# Patient Record
Sex: FEMALE | Race: WHITE | Employment: OTHER | URBAN - NONMETROPOLITAN AREA
[De-identification: names, ages, dates, MRNs, and addresses within clinical notes are randomized per-mention and may not be internally consistent; named-entity substitution may affect disease eponyms.]

---

## 2017-01-11 LAB
CHOLESTEROL, TOTAL: 143 MG/DL
CHOLESTEROL/HDL RATIO: 4.3
HDLC SERPL-MCNC: 33 MG/DL (ref 35–70)
LDL CHOLESTEROL CALCULATED: 88 MG/DL (ref 0–160)
TRIGL SERPL-MCNC: 108 MG/DL
VLDLC SERPL CALC-MCNC: ABNORMAL MG/DL

## 2017-11-09 LAB
CHOLESTEROL, TOTAL: 141 MG/DL
CHOLESTEROL/HDL RATIO: 2
HDLC SERPL-MCNC: 38 MG/DL (ref 35–70)
LDL CHOLESTEROL CALCULATED: 77 MG/DL (ref 0–160)
TRIGL SERPL-MCNC: 132 MG/DL
VLDLC SERPL CALC-MCNC: NORMAL MG/DL

## 2018-03-15 LAB
CHOLESTEROL, TOTAL: 141 MG/DL
CHOLESTEROL/HDL RATIO: NORMAL
HDLC SERPL-MCNC: 38 MG/DL (ref 35–70)
LDL CHOLESTEROL CALCULATED: 58 MG/DL (ref 0–160)
TRIGL SERPL-MCNC: 225 MG/DL
VLDLC SERPL CALC-MCNC: NORMAL MG/DL

## 2018-09-17 LAB
CHOLESTEROL, TOTAL: 139 MG/DL
CHOLESTEROL/HDL RATIO: 1.8
HDLC SERPL-MCNC: 42 MG/DL (ref 35–70)
LDL CHOLESTEROL CALCULATED: 78 MG/DL (ref 0–160)
TRIGL SERPL-MCNC: 93 MG/DL
VLDLC SERPL CALC-MCNC: NORMAL MG/DL

## 2019-03-19 ENCOUNTER — OFFICE VISIT (OUTPATIENT)
Dept: INTERNAL MEDICINE | Age: 74
End: 2019-03-19
Payer: MEDICARE

## 2019-03-19 ENCOUNTER — HOSPITAL ENCOUNTER (OUTPATIENT)
Dept: LAB | Age: 74
Discharge: HOME | End: 2019-03-19
Payer: MEDICARE

## 2019-03-19 VITALS
HEART RATE: 60 BPM | RESPIRATION RATE: 14 BRPM | BODY MASS INDEX: 22.99 KG/M2 | HEIGHT: 65 IN | WEIGHT: 138 LBS | SYSTOLIC BLOOD PRESSURE: 130 MMHG | DIASTOLIC BLOOD PRESSURE: 82 MMHG

## 2019-03-19 DIAGNOSIS — R31.9 HEMATURIA, UNSPECIFIED TYPE: ICD-10-CM

## 2019-03-19 DIAGNOSIS — I10 ESSENTIAL HYPERTENSION: Primary | ICD-10-CM

## 2019-03-19 DIAGNOSIS — K21.9 GASTROESOPHAGEAL REFLUX DISEASE WITHOUT ESOPHAGITIS: ICD-10-CM

## 2019-03-19 DIAGNOSIS — Z12.39 ENCOUNTER FOR SCREENING FOR MALIGNANT NEOPLASM OF BREAST: ICD-10-CM

## 2019-03-19 DIAGNOSIS — I10 HYPERTENSION, UNSPECIFIED TYPE: ICD-10-CM

## 2019-03-19 DIAGNOSIS — E78.5 HYPERLIPIDEMIA, UNSPECIFIED HYPERLIPIDEMIA TYPE: ICD-10-CM

## 2019-03-19 LAB
-: ABNORMAL
AMORPHOUS: ABNORMAL
BACTERIA: ABNORMAL
BILIRUBIN URINE: NEGATIVE
CASTS UA: ABNORMAL /LPF (ref 0–2)
COLOR: ABNORMAL
COMMENT UA: ABNORMAL
CRYSTALS, UA: ABNORMAL /HPF
EPITHELIAL CELLS UA: ABNORMAL /HPF (ref 0–5)
GLUCOSE URINE: NEGATIVE
KETONES, URINE: NEGATIVE
LEUKOCYTE ESTERASE, URINE: ABNORMAL
MUCUS: ABNORMAL
NITRITE, URINE: NEGATIVE
OTHER OBSERVATIONS UA: ABNORMAL
PH UA: 6 (ref 5–6)
PROTEIN UA: NEGATIVE
RBC UA: ABNORMAL /HPF (ref 0–4)
RENAL EPITHELIAL, UA: ABNORMAL /HPF
SPECIFIC GRAVITY UA: 1 (ref 1.01–1.02)
TRICHOMONAS: ABNORMAL
TURBIDITY: ABNORMAL
URINE HGB: ABNORMAL
UROBILINOGEN, URINE: NORMAL
WBC UA: ABNORMAL /HPF (ref 0–4)
YEAST: ABNORMAL

## 2019-03-19 PROCEDURE — 99204 OFFICE O/P NEW MOD 45 MIN: CPT | Performed by: INTERNAL MEDICINE

## 2019-03-19 PROCEDURE — 81001 URINALYSIS AUTO W/SCOPE: CPT

## 2019-03-19 RX ORDER — FLUTICASONE PROPIONATE 50 MCG
2 SPRAY, SUSPENSION (ML) NASAL DAILY PRN
COMMUNITY

## 2019-03-19 RX ORDER — CETIRIZINE HYDROCHLORIDE 10 MG/1
10 TABLET ORAL DAILY
COMMUNITY
End: 2020-09-30

## 2019-03-19 RX ORDER — CHLORAL HYDRATE 500 MG
1000 CAPSULE ORAL DAILY
Status: ON HOLD | COMMUNITY
End: 2022-08-09

## 2019-03-19 RX ORDER — TRETINOIN 0.5 MG/G
CREAM TOPICAL DAILY
COMMUNITY

## 2019-03-19 RX ORDER — ATORVASTATIN CALCIUM 40 MG/1
40 TABLET, FILM COATED ORAL DAILY
COMMUNITY
End: 2019-10-03 | Stop reason: SDUPTHER

## 2019-03-19 RX ORDER — RANITIDINE HCL 75 MG
75 TABLET ORAL EVERY MORNING
COMMUNITY
End: 2020-03-31 | Stop reason: ALTCHOICE

## 2019-03-19 ASSESSMENT — ENCOUNTER SYMPTOMS
EYE PAIN: 0
ABDOMINAL PAIN: 0
DIARRHEA: 0
NAUSEA: 0
CONSTIPATION: 0
BLOOD IN STOOL: 0
HEARTBURN: 1
SHORTNESS OF BREATH: 0
VOMITING: 0
COUGH: 0
BACK PAIN: 0

## 2019-03-19 ASSESSMENT — PATIENT HEALTH QUESTIONNAIRE - PHQ9
1. LITTLE INTEREST OR PLEASURE IN DOING THINGS: 0
2. FEELING DOWN, DEPRESSED OR HOPELESS: 0
SUM OF ALL RESPONSES TO PHQ QUESTIONS 1-9: 0
SUM OF ALL RESPONSES TO PHQ QUESTIONS 1-9: 0
SUM OF ALL RESPONSES TO PHQ9 QUESTIONS 1 & 2: 0

## 2019-03-27 ENCOUNTER — HOSPITAL ENCOUNTER (OUTPATIENT)
Dept: LAB | Age: 74
Discharge: HOME | End: 2019-03-27
Payer: MEDICARE

## 2019-03-27 DIAGNOSIS — I10 HYPERTENSION, UNSPECIFIED TYPE: ICD-10-CM

## 2019-03-27 DIAGNOSIS — E78.5 HYPERLIPIDEMIA, UNSPECIFIED HYPERLIPIDEMIA TYPE: ICD-10-CM

## 2019-03-27 LAB
ALBUMIN SERPL-MCNC: 4.2 G/DL (ref 3.5–5.2)
ALBUMIN/GLOBULIN RATIO: 1.3 (ref 1–2.5)
ALP BLD-CCNC: 74 U/L (ref 35–104)
ALT SERPL-CCNC: 12 U/L (ref 5–33)
ANION GAP SERPL CALCULATED.3IONS-SCNC: 13 MMOL/L (ref 9–17)
AST SERPL-CCNC: 18 U/L
BILIRUB SERPL-MCNC: 0.38 MG/DL (ref 0.3–1.2)
BUN BLDV-MCNC: 11 MG/DL (ref 8–23)
BUN/CREAT BLD: 22 (ref 9–20)
CALCIUM SERPL-MCNC: 9.6 MG/DL (ref 8.6–10.4)
CHLORIDE BLD-SCNC: 94 MMOL/L (ref 98–107)
CHOLESTEROL/HDL RATIO: 3.2
CHOLESTEROL: 125 MG/DL
CO2: 29 MMOL/L (ref 20–31)
CREAT SERPL-MCNC: 0.51 MG/DL (ref 0.5–0.9)
GFR AFRICAN AMERICAN: >60 ML/MIN
GFR NON-AFRICAN AMERICAN: >60 ML/MIN
GFR SERPL CREATININE-BSD FRML MDRD: ABNORMAL ML/MIN/{1.73_M2}
GFR SERPL CREATININE-BSD FRML MDRD: ABNORMAL ML/MIN/{1.73_M2}
GLUCOSE BLD-MCNC: 101 MG/DL (ref 70–99)
HDLC SERPL-MCNC: 39 MG/DL
LDL CHOLESTEROL: 70 MG/DL (ref 0–130)
POTASSIUM SERPL-SCNC: 4.1 MMOL/L (ref 3.7–5.3)
SODIUM BLD-SCNC: 136 MMOL/L (ref 135–144)
TOTAL PROTEIN: 7.5 G/DL (ref 6.4–8.3)
TRIGL SERPL-MCNC: 78 MG/DL
VLDLC SERPL CALC-MCNC: ABNORMAL MG/DL (ref 1–30)

## 2019-03-27 PROCEDURE — 80061 LIPID PANEL: CPT

## 2019-03-27 PROCEDURE — 80053 COMPREHEN METABOLIC PANEL: CPT

## 2019-03-27 PROCEDURE — 36415 COLL VENOUS BLD VENIPUNCTURE: CPT

## 2019-04-11 ENCOUNTER — TELEPHONE (OUTPATIENT)
Dept: INTERNAL MEDICINE | Age: 74
End: 2019-04-11

## 2019-04-11 RX ORDER — AMOXICILLIN AND CLAVULANATE POTASSIUM 875; 125 MG/1; MG/1
1 TABLET, FILM COATED ORAL 2 TIMES DAILY
Qty: 20 TABLET | Refills: 0 | Status: SHIPPED | OUTPATIENT
Start: 2019-04-11 | End: 2019-04-21

## 2019-04-18 ENCOUNTER — TELEPHONE (OUTPATIENT)
Dept: INTERNAL MEDICINE | Age: 74
End: 2019-04-18

## 2019-04-18 ENCOUNTER — OFFICE VISIT (OUTPATIENT)
Dept: INTERNAL MEDICINE | Age: 74
End: 2019-04-18
Payer: MEDICARE

## 2019-04-18 VITALS
SYSTOLIC BLOOD PRESSURE: 112 MMHG | RESPIRATION RATE: 16 BRPM | TEMPERATURE: 98.1 F | HEART RATE: 60 BPM | WEIGHT: 137 LBS | HEIGHT: 65 IN | DIASTOLIC BLOOD PRESSURE: 68 MMHG | BODY MASS INDEX: 22.82 KG/M2

## 2019-04-18 DIAGNOSIS — H61.22 EXCESSIVE CERUMEN IN EAR CANAL, LEFT: ICD-10-CM

## 2019-04-18 DIAGNOSIS — J06.9 UPPER RESPIRATORY TRACT INFECTION, UNSPECIFIED TYPE: ICD-10-CM

## 2019-04-18 DIAGNOSIS — I10 HYPERTENSION, UNSPECIFIED TYPE: ICD-10-CM

## 2019-04-18 DIAGNOSIS — Z01.818 PREOPERATIVE EXAMINATION: Primary | ICD-10-CM

## 2019-04-18 DIAGNOSIS — K21.9 GASTROESOPHAGEAL REFLUX DISEASE WITHOUT ESOPHAGITIS: ICD-10-CM

## 2019-04-18 DIAGNOSIS — E78.5 HYPERLIPIDEMIA, UNSPECIFIED HYPERLIPIDEMIA TYPE: ICD-10-CM

## 2019-04-18 PROCEDURE — 93000 ELECTROCARDIOGRAM COMPLETE: CPT | Performed by: INTERNAL MEDICINE

## 2019-04-18 PROCEDURE — 69209 REMOVE IMPACTED EAR WAX UNI: CPT | Performed by: NURSE PRACTITIONER

## 2019-04-18 PROCEDURE — 99214 OFFICE O/P EST MOD 30 MIN: CPT | Performed by: NURSE PRACTITIONER

## 2019-04-18 RX ORDER — LANOLIN ALCOHOL/MO/W.PET/CERES
500 CREAM (GRAM) TOPICAL DAILY
COMMUNITY

## 2019-04-18 ASSESSMENT — ENCOUNTER SYMPTOMS
CHEST TIGHTNESS: 0
VOMITING: 0
SORE THROAT: 0
SHORTNESS OF BREATH: 0
NAUSEA: 0
DIARRHEA: 0

## 2019-04-18 NOTE — PROGRESS NOTES
[facility]  [address] [address], [address] Mariela Ruffin  [phone]      Bryanna Burns is a 72 y.o. female whopresents today for a preoperative medical evaluation. She has a proposed L second hammertoe correction with Dr. Willian Benítez on 05/08/19. HPI:     HPI    Pre-op questions:    Chest pain or breathlessness when you climb up two flights of stairs at normal speed? no     Kidney disease? no     Has anyone in your family (blood relatives) had a problem following an anesthetic? no     History of problems with anesthesia? no     History of MI, arrhythmia, CVA? No, has had occasional PVC     Epilepsy or seizures? no    History of thyroid disease? no     Pain, stiffness or arthritis in your neck or jaw?  no      History chest pain, liver disease, heart failure? no     Smoker? no    Illicit drug use? no    Alcohol use? no    Asthma or bronchitis? no    Diabetic? no    Hypertension is currently well controlled on metoprolol. She continues on atorvastatin for her hyperlipidemia. Denies chest pain or dyspnea. GERD is stable on Zantac. Recent URI, currently on augmentin. Continues to have some cough and laryngitis issues. Congestion is improving. She is currently following with Dr. Jo-Ann De Souza for squamous cell carcinoma, recent lesion removed from R chin. Past Medical History:   Diagnosis Date    Bean's esophagus     Chronic kidney disease     chronic renal insufficiency    Dizziness     GERD (gastroesophageal reflux disease)     Hyperlipidemia     Hypertension     Measles     Osteoarthritis     Osteoporosis     Seasonal allergies       Past Surgical History:   Procedure Laterality Date    BREAST BIOPSY Right 01/31/03    lipoma    COLONOSCOPY  07/11/12    -moderate sigmoid diverticulosis-otherwise normal-repeat 10 years.     COLONOSCOPY  10/03/02    -mild erythema in the cecum -moderate diverticulosis sigmoid colon-small internal hemorrhoids-repeat 10 years.  DILATION AND CURETTAGE OF UTERUS  07/28/2000    HYSTERECTOMY  04/19/01    [facility] of adhesions. [address] [facility]    UPPER GASTROINTESTINAL ENDOSCOPY  12/16/09    -small hiatal hernia otherwise normal    UPPER GASTROINTESTINAL ENDOSCOPY  08/18/99    -moderate size hiatal hernia-mild to moderate grade I-II distal esophagitis. -mild Schatzki's ring     Family History   Problem Relation Age of Onset    Emphysema Mother     Cancer Mother         breast    COPD Mother     Heart Disease Father     High Blood Pressure Father      Social History     Tobacco Use    Smoking status: Never Smoker    Smokeless tobacco: Never Used   Substance Use Topics    Alcohol use: No      Current Outpatient Medications   Medication Sig Dispense Refill    niacin (SLO-NIACIN) 500 MG extended release tablet Take 500 mg by mouth daily      amoxicillin-clavulanate (AUGMENTIN) 875-125 MG per tablet Take 1 tablet by mouth 2 times daily for 10 days 20 tablet 0    ranitidine (ZANTAC) 75 MG tablet Take 75 mg by mouth every morning      atorvastatin (LIPITOR) 40 MG tablet Take 40 mg by mouth daily      Calcium Citrate-Vitamin D (CALCIUM CITRATE + D PO) Take 2 tablets by mouth daily      Multiple Vitamins-Minerals (WOMENS MULTIVITAMIN PO) Take 1 tablet by mouth daily      cetirizine (ZYRTEC ALLERGY) 10 MG tablet Take 10 mg by mouth daily      Diphenhydramine-APAP, sleep, (TYLENOL PM EXTRA STRENGTH PO) Take 2 tablets by mouth as needed      Omega-3 Fatty Acids (FISH OIL) 1000 MG CAPS Take 1,000 mg by mouth daily      fluticasone (FLONASE) 50 MCG/ACT nasal spray 2 sprays by Each Nare route daily as needed for Rhinitis      tretinoin (RETIN-A) 0.05 % cream Apply topically daily Indications: 5 times a week Apply topically nightly.  metoprolol (TOPROL XL) 50 MG XL tablet Take 50 mg by mouth daily.       triamterene-hydrochlorothiazide (MAXZIDE-25) 37.5-25 MG per tablet exudate. Eyes: Conjunctivae, EOM and lids are normal.   Neck: Neck supple. No thyromegaly present. Cardiovascular: Normal rate and regular rhythm. No murmur heard. Pulmonary/Chest: Effort normal and breath sounds normal. No accessory muscle usage. No respiratory distress. She has no wheezes. She has no rhonchi. She has no rales. Abdominal: Soft. There is no tenderness. There is no rebound and no guarding. Musculoskeletal: Normal range of motion. She exhibits no edema. Lymphadenopathy:     She has no cervical adenopathy. Neurological: She is alert. Coordination and gait normal.   Skin: Skin is warm and dry. No cyanosis. Nails show no clubbing. Psychiatric: She has a normal mood and affect. Her speech is normal and behavior is normal.   Nursing note and vitals reviewed. Assessment/Plan:        1. Preoperative examination  - EKG shows sinus bradycardia. Patient is considered low risk for scheduled procedure. - EKG 12 Lead; Future  - EKG 12 Lead    2. Hypertension, unspecified type,  controlled  - Continue current medications    3. Hyperlipidemia, unspecified hyperlipidemia type,  stable  - Continue atorvastatin    4. Gastroesophageal reflux disease without esophagitis, stable  - Continue zantac    5. Excessive cerumen in ear canal, left, new problem  - IA REMOVAL IMPACTED CERUMEN IRRIGATION/LVG UNILAT    6. Squamous cell carcinoma, stable  - Continue to follow with dermatology    7. Upper respiratory tract infection, unspecified type, improving  - Continue augmentin, follow up if symptoms worsen or fail to resolve. Return if symptoms worsen or fail to improve. Orders Placed This Encounter   Procedures    EKG 12 Lead     Standing Status:   Future     Number of Occurrences:   1     Standing Expiration Date:   4/18/2020     Order Specific Question:   Reason for Exam?     Answer:    Other     Comments:   preoperative exam    IA REMOVAL IMPACTED CERUMEN IRRIGATION/LVG UNILAT     No orders of the defined types were placed in this encounter. Allpatient questions answered. Pt voiced understanding.              Electronically signed by SHONDA Hull on 4/18/2019 at 8:47 AM

## 2019-04-26 RX ORDER — TRIAMTERENE AND HYDROCHLOROTHIAZIDE 37.5; 25 MG/1; MG/1
1 TABLET ORAL WEEKLY
Qty: 12 TABLET | Refills: 3 | Status: SHIPPED | OUTPATIENT
Start: 2019-04-26 | End: 2019-05-02 | Stop reason: DRUGHIGH

## 2019-04-26 RX ORDER — METOPROLOL SUCCINATE 50 MG/1
50 TABLET, EXTENDED RELEASE ORAL DAILY
Qty: 90 TABLET | Refills: 3 | Status: SHIPPED | OUTPATIENT
Start: 2019-04-26 | End: 2020-04-20

## 2019-05-02 ENCOUNTER — TELEPHONE (OUTPATIENT)
Dept: INTERNAL MEDICINE | Age: 74
End: 2019-05-02

## 2019-05-02 RX ORDER — TRIAMTERENE AND HYDROCHLOROTHIAZIDE 37.5; 25 MG/1; MG/1
1 TABLET ORAL DAILY
COMMUNITY
End: 2019-05-02 | Stop reason: SDUPTHER

## 2019-05-02 NOTE — TELEPHONE ENCOUNTER
Patient received her triamterene-hydrochlorothiazide from [facility]. Directions state take 1 tablet weekly. Patient states she takes 1 tablet daily. She is asking for a new prescription. Thanks.

## 2019-05-03 RX ORDER — TRIAMTERENE AND HYDROCHLOROTHIAZIDE 37.5; 25 MG/1; MG/1
1 TABLET ORAL DAILY
Qty: 90 TABLET | Refills: 3 | Status: SHIPPED | OUTPATIENT
Start: 2019-05-03 | End: 2020-04-09

## 2019-08-27 ENCOUNTER — HOSPITAL ENCOUNTER (OUTPATIENT)
Dept: MAMMOGRAPHY | Age: 74
Discharge: HOME | End: 2019-08-29
Payer: MEDICARE

## 2019-08-27 DIAGNOSIS — Z12.39 ENCOUNTER FOR SCREENING FOR MALIGNANT NEOPLASM OF BREAST: ICD-10-CM

## 2019-08-27 PROCEDURE — 77063 BREAST TOMOSYNTHESIS BI: CPT

## 2019-09-18 ENCOUNTER — OFFICE VISIT (OUTPATIENT)
Dept: INTERNAL MEDICINE | Age: 74
End: 2019-09-18
Payer: MEDICARE

## 2019-09-18 VITALS
SYSTOLIC BLOOD PRESSURE: 122 MMHG | RESPIRATION RATE: 14 BRPM | HEIGHT: 65 IN | HEART RATE: 60 BPM | WEIGHT: 135 LBS | DIASTOLIC BLOOD PRESSURE: 70 MMHG | BODY MASS INDEX: 22.49 KG/M2

## 2019-09-18 DIAGNOSIS — Z00.00 ROUTINE GENERAL MEDICAL EXAMINATION AT A HEALTH CARE FACILITY: Primary | ICD-10-CM

## 2019-09-18 DIAGNOSIS — Z00.00 MEDICARE ANNUAL WELLNESS VISIT, SUBSEQUENT: ICD-10-CM

## 2019-09-18 DIAGNOSIS — E78.5 HYPERLIPIDEMIA, UNSPECIFIED HYPERLIPIDEMIA TYPE: ICD-10-CM

## 2019-09-18 DIAGNOSIS — Z23 NEED FOR VACCINATION: ICD-10-CM

## 2019-09-18 DIAGNOSIS — I10 ESSENTIAL HYPERTENSION: ICD-10-CM

## 2019-09-18 DIAGNOSIS — Z13.1 SCREENING FOR DIABETES MELLITUS: ICD-10-CM

## 2019-09-18 DIAGNOSIS — K21.9 GASTROESOPHAGEAL REFLUX DISEASE WITHOUT ESOPHAGITIS: ICD-10-CM

## 2019-09-18 PROCEDURE — G0008 ADMIN INFLUENZA VIRUS VAC: HCPCS | Performed by: INTERNAL MEDICINE

## 2019-09-18 PROCEDURE — 99214 OFFICE O/P EST MOD 30 MIN: CPT | Performed by: INTERNAL MEDICINE

## 2019-09-18 PROCEDURE — G0439 PPPS, SUBSEQ VISIT: HCPCS | Performed by: INTERNAL MEDICINE

## 2019-09-18 PROCEDURE — 90653 IIV ADJUVANT VACCINE IM: CPT | Performed by: INTERNAL MEDICINE

## 2019-09-18 RX ORDER — VITAMIN B COMPLEX
1 CAPSULE ORAL DAILY
COMMUNITY
End: 2020-07-06

## 2019-09-18 ASSESSMENT — LIFESTYLE VARIABLES
HOW OFTEN DURING THE LAST YEAR HAVE YOU BEEN UNABLE TO REMEMBER WHAT HAPPENED THE NIGHT BEFORE BECAUSE YOU HAD BEEN DRINKING: 0
AUDIT-C TOTAL SCORE: 1
HOW OFTEN DO YOU HAVE A DRINK CONTAINING ALCOHOL: 1
HOW OFTEN DURING THE LAST YEAR HAVE YOU FOUND THAT YOU WERE NOT ABLE TO STOP DRINKING ONCE YOU HAD STARTED: 0
HOW OFTEN DURING THE LAST YEAR HAVE YOU NEEDED AN ALCOHOLIC DRINK FIRST THING IN THE MORNING TO GET YOURSELF GOING AFTER A NIGHT OF HEAVY DRINKING: 0
HAVE YOU OR SOMEONE ELSE BEEN INJURED AS A RESULT OF YOUR DRINKING: 0
HOW OFTEN DO YOU HAVE SIX OR MORE DRINKS ON ONE OCCASION: 0
HOW OFTEN DURING THE LAST YEAR HAVE YOU HAD A FEELING OF GUILT OR REMORSE AFTER DRINKING: 0
HOW OFTEN DURING THE LAST YEAR HAVE YOU FAILED TO DO WHAT WAS NORMALLY EXPECTED FROM YOU BECAUSE OF DRINKING: 0
AUDIT TOTAL SCORE: 1
HOW MANY STANDARD DRINKS CONTAINING ALCOHOL DO YOU HAVE ON A TYPICAL DAY: 0
HAS A RELATIVE, FRIEND, DOCTOR, OR ANOTHER HEALTH PROFESSIONAL EXPRESSED CONCERN ABOUT YOUR DRINKING OR SUGGESTED YOU CUT DOWN: 0

## 2019-09-18 ASSESSMENT — ENCOUNTER SYMPTOMS
DIARRHEA: 0
COUGH: 0
ABDOMINAL PAIN: 0
HEARTBURN: 1
SHORTNESS OF BREATH: 0
VOMITING: 0
CONSTIPATION: 0
EYE PAIN: 0
NAUSEA: 0
BACK PAIN: 0
BLOOD IN STOOL: 0

## 2019-09-18 ASSESSMENT — PATIENT HEALTH QUESTIONNAIRE - PHQ9
SUM OF ALL RESPONSES TO PHQ QUESTIONS 1-9: 0
SUM OF ALL RESPONSES TO PHQ QUESTIONS 1-9: 0

## 2019-09-18 NOTE — PROGRESS NOTES
Have you had an allergic reaction to the flu (influenza) shot? no  Are you allergic to eggs or any component of the flu vaccine? no  Do you have a history of Guillain-Pelzer Syndrome (GBS), which is paralysis after receiving the flu vaccine? no  Are you feeling well today? yes  Flu vaccine given as ordered. Patient tolerated it well. No questions re: VIS information. Medicare Annual Wellness Visit  Name: Jose Power Date: 2019   MRN: [id number] Sex: Female   Age: 69 y.o. Ethnicity: Non-/Non    : 1945 Race: Urmila Pimentel is here for Medicare AWV; Hypertension (6 month appt); Hyperlipidemia; and Gastroesophageal Reflux    Screenings for behavioral, psychosocial and functional/safety risks, and cognitive dysfunction are all negative except as indicated below. These results, as well as other patient data from the [address] form, are documented in Flowsheets linked to this Encounter. No Known Allergies    Prior to Visit Medications    Medication Sig Taking?  Authorizing Provider   b complex vitamins capsule Take 1 capsule by mouth daily Yes Historical Provider, MD   triamterene-hydrochlorothiazide (MAXZIDE-25) 37.5-25 MG per tablet Take 1 tablet by mouth daily Yes Patt Taylor MD   metoprolol succinate (TOPROL XL) 50 MG extended release tablet Take 1 tablet by mouth daily Yes Patt Taylor MD   niacin (SLO-NIACIN) 500 MG extended release tablet Take 500 mg by mouth daily Yes Historical Provider, MD   ranitidine (ZANTAC) 75 MG tablet Take 75 mg by mouth every morning Yes Historical Provider, MD   atorvastatin (LIPITOR) 40 MG tablet Take 40 mg by mouth daily Yes Historical Provider, MD   Calcium Citrate-Vitamin D (CALCIUM CITRATE + D PO) Take 2 tablets by mouth daily Yes Historical Provider, MD   cetirizine (ZYRTEC ALLERGY) 10 MG tablet Take 10 mg by mouth daily Yes Historical Provider, MD   Diphenhydramine-APAP, sleep, (TYLENOL PM [address] banister?: (no stairs)  Are your doorways, halls and stairs free of clutter?: Yes  Do you always fasten your seatbelt when you are in a car?: Yes  Safety Interventions:  · Home safety tips provided    Personalized Preventive Plan   Current Health Maintenance Status  Immunization History   Administered Date(s) Administered    Influenza Virus Vaccine 11/08/2012, 10/21/2014, 11/01/2016    Influenza, High Dose (Fluzone 65 yrs and older) 10/13/2016, 11/09/2017, 09/17/2018    Influenza, Triv, inactivated, subunit, adjuvanted, IM (Fluad 65 yrs and older) 09/18/2019    Pneumococcal Conjugate 13-valent ([id number]) 11/09/2017    Pneumococcal Polysaccharide ([id number]) 12/12/2012    Tdap (Boostrix, Adacel) 05/17/2018    Zoster Live (Zostavax) 08/06/2012        Health Maintenance   Topic Date Due    Colon cancer screen colonoscopy  08/23/1997    Annual Wellness Visit (AWV)  05/29/2019    Shingles Vaccine (2 of 3) 03/19/2020 (Originally 10/1/2012)    Hepatitis C screen  09/18/2020 (Originally 1945)    Potassium monitoring  03/27/2020    Creatinine monitoring  03/27/2020    Breast cancer screen  08/27/2021    Lipid screen  03/27/2024    DTaP/Tdap/Td vaccine (2 - Td) 05/17/2028    DEXA (modify frequency per FRAX score)  Completed    Flu vaccine  Completed    Pneumococcal 65+ years Vaccine  Completed     Recommendations for Preventive Services Due: see orders and patient instructions/AVS.  . Recommended screening schedule for the next 5-10 years is provided to the patient in written form: see Patient Instructions/AVS.    IOmar LPN, 1/96/1923, performed the documented evaluation under the direct supervision of the attending physician.

## 2019-09-18 NOTE — PROGRESS NOTES
Readings from Last 3 Encounters:   09/18/19 122/70   04/18/19 112/68   03/19/19 130/82              Past Medical History:   Diagnosis Date    Bean's esophagus     Chronic kidney disease     chronic renal insufficiency    Dizziness     GERD (gastroesophageal reflux disease)     Hyperlipidemia     Hypertension     Measles     Osteoarthritis     Osteoporosis     Seasonal allergies       Past Surgical History:   Procedure Laterality Date    BREAST BIOPSY Right 01/31/03    lipoma    COLONOSCOPY  07/11/12    -moderate sigmoid diverticulosis-otherwise normal-repeat 10 years.  COLONOSCOPY  10/03/02    -mild erythema in the cecum -moderate diverticulosis sigmoid colon-small internal hemorrhoids-repeat 10 years.  DILATION AND CURETTAGE OF UTERUS  07/28/2000    HYSTERECTOMY  04/19/01    [id number] of adhesions. [address] [facility]    UPPER GASTROINTESTINAL ENDOSCOPY  12/16/09    -small hiatal hernia otherwise normal    UPPER GASTROINTESTINAL ENDOSCOPY  08/18/99    -moderate size hiatal hernia-mild to moderate grade I-II distal esophagitis. -mild Schatzki's ring       Family History   Problem Relation Age of Onset    Emphysema Mother     Cancer Mother         breast    COPD Mother     Heart Disease Father     High Blood Pressure Father           Social History     Tobacco Use    Smoking status: Never Smoker    Smokeless tobacco: Never Used   Substance Use Topics    Alcohol use: No         Current Outpatient Medications   Medication Sig Dispense Refill    b complex vitamins capsule Take 1 capsule by mouth daily      triamterene-hydrochlorothiazide (MAXZIDE-25) 37.5-25 MG per tablet Take 1 tablet by mouth daily 90 tablet 3    metoprolol succinate (TOPROL XL) 50 MG extended release tablet Take 1 tablet by mouth daily 90 tablet 3    niacin (SLO-NIACIN) 500 MG extended release tablet Take 500 mg by mouth daily      ranitidine (ZANTAC) 75 MG tablet Take 75 mg urinating, dysuria and hematuria. Musculoskeletal: Negative for arthralgias, back pain, gait problem and neck pain. Skin: Negative for pallor and rash. Neurological: Negative for dizziness, weakness, numbness and headaches. Hematological: Negative for adenopathy. Does not bruise/bleed easily. Psychiatric/Behavioral: Negative for confusion. The patient is not nervous/anxious. Objective:     Physical Exam   Constitutional: She is oriented to person, place, and time. She appears well-developed and well-nourished. HENT:   Head: Normocephalic and atraumatic. Eyes: Pupils are equal, round, and reactive to light. EOM are normal.   Neck: Neck supple. Cardiovascular: Normal rate and regular rhythm. Exam reveals no gallop and no friction rub. No murmur heard. Pulmonary/Chest: Effort normal and breath sounds normal. She has no wheezes. She has no rales. Abdominal: Soft. She exhibits no distension and no mass. There is no tenderness. There is no rebound. Musculoskeletal: Normal range of motion. She exhibits no edema. Lymphadenopathy:     She has no cervical adenopathy. Neurological: She is alert and oriented to person, place, and time. No cranial nerve deficit (grossly). Skin: Skin is warm and dry. No rash noted. Psychiatric: She has a normal mood and affect. Thought content normal.   Vitals reviewed. /70 (Site: Left Upper Arm, Position: Sitting, Cuff Size: Medium Adult)   Pulse 60   Resp 14   Ht 5' 5\" (1.651 m)   Wt 135 lb (61.2 kg)   LMP  (LMP Unknown)   BMI 22.47 kg/m²     Assessment:       Diagnosis Orders   1. Routine general medical examination at a health care facility     2. Medicare annual wellness visit, subsequent  Comprehensive Metabolic Panel   3. Essential hypertension  Lipid Panel   4. Hyperlipidemia, unspecified hyperlipidemia type  Lipid Panel   5. Gastroesophageal reflux disease without esophagitis  Lipid Panel   6.  Screening for diabetes mellitus

## 2019-10-03 RX ORDER — ATORVASTATIN CALCIUM 40 MG/1
40 TABLET, FILM COATED ORAL DAILY
Qty: 90 TABLET | Refills: 3 | Status: SHIPPED | OUTPATIENT
Start: 2019-10-03 | End: 2020-03-30 | Stop reason: SDUPTHER

## 2020-03-11 ENCOUNTER — HOSPITAL ENCOUNTER (OUTPATIENT)
Dept: LAB | Age: 75
Discharge: HOME | End: 2020-03-11
Payer: MEDICARE

## 2020-03-11 LAB
ALBUMIN SERPL-MCNC: 4.5 G/DL (ref 3.5–5.2)
ALBUMIN/GLOBULIN RATIO: 1.6 (ref 1–2.5)
ALP BLD-CCNC: 72 U/L (ref 35–104)
ALT SERPL-CCNC: 12 U/L (ref 5–33)
ANION GAP SERPL CALCULATED.3IONS-SCNC: 11 MMOL/L (ref 9–17)
AST SERPL-CCNC: 20 U/L
BILIRUB SERPL-MCNC: 0.59 MG/DL (ref 0.3–1.2)
BUN BLDV-MCNC: 12 MG/DL (ref 8–23)
BUN/CREAT BLD: 22 (ref 9–20)
CALCIUM SERPL-MCNC: 9.6 MG/DL (ref 8.6–10.4)
CHLORIDE BLD-SCNC: 95 MMOL/L (ref 98–107)
CHOLESTEROL/HDL RATIO: 3.9
CHOLESTEROL: 139 MG/DL
CO2: 29 MMOL/L (ref 20–31)
CREAT SERPL-MCNC: 0.55 MG/DL (ref 0.5–0.9)
GFR AFRICAN AMERICAN: >60 ML/MIN
GFR NON-AFRICAN AMERICAN: >60 ML/MIN
GFR SERPL CREATININE-BSD FRML MDRD: ABNORMAL ML/MIN/{1.73_M2}
GFR SERPL CREATININE-BSD FRML MDRD: ABNORMAL ML/MIN/{1.73_M2}
GLUCOSE BLD-MCNC: 94 MG/DL (ref 70–99)
HDLC SERPL-MCNC: 36 MG/DL
LDL CHOLESTEROL: 77 MG/DL (ref 0–130)
POTASSIUM SERPL-SCNC: 4.2 MMOL/L (ref 3.7–5.3)
SODIUM BLD-SCNC: 135 MMOL/L (ref 135–144)
TOTAL PROTEIN: 7.4 G/DL (ref 6.4–8.3)
TRIGL SERPL-MCNC: 132 MG/DL
VLDLC SERPL CALC-MCNC: ABNORMAL MG/DL (ref 1–30)

## 2020-03-11 PROCEDURE — 36415 COLL VENOUS BLD VENIPUNCTURE: CPT

## 2020-03-11 PROCEDURE — 80053 COMPREHEN METABOLIC PANEL: CPT

## 2020-03-11 PROCEDURE — 80061 LIPID PANEL: CPT

## 2020-03-30 RX ORDER — ATORVASTATIN CALCIUM 40 MG/1
40 TABLET, FILM COATED ORAL DAILY
Qty: 90 TABLET | Refills: 3 | Status: SHIPPED | OUTPATIENT
Start: 2020-03-30 | End: 2020-03-31 | Stop reason: SDUPTHER

## 2020-03-31 ENCOUNTER — VIRTUAL VISIT (OUTPATIENT)
Dept: INTERNAL MEDICINE | Age: 75
End: 2020-03-31
Payer: MEDICARE

## 2020-03-31 VITALS
BODY MASS INDEX: 22.53 KG/M2 | HEIGHT: 65 IN | DIASTOLIC BLOOD PRESSURE: 78 MMHG | SYSTOLIC BLOOD PRESSURE: 136 MMHG | WEIGHT: 135.2 LBS | HEART RATE: 72 BPM

## 2020-03-31 PROCEDURE — 99213 OFFICE O/P EST LOW 20 MIN: CPT | Performed by: INTERNAL MEDICINE

## 2020-03-31 PROCEDURE — 99214 OFFICE O/P EST MOD 30 MIN: CPT

## 2020-03-31 RX ORDER — FAMOTIDINE 20 MG/1
20 TABLET, FILM COATED ORAL DAILY
COMMUNITY

## 2020-03-31 RX ORDER — ATORVASTATIN CALCIUM 40 MG/1
40 TABLET, FILM COATED ORAL DAILY
Qty: 90 TABLET | Refills: 3 | Status: SHIPPED | OUTPATIENT
Start: 2020-03-31 | End: 2021-03-29

## 2020-03-31 ASSESSMENT — ENCOUNTER SYMPTOMS
DIARRHEA: 0
ABDOMINAL PAIN: 0
EYE PAIN: 0
HEARTBURN: 1
VOMITING: 0
COUGH: 0
SHORTNESS OF BREATH: 0
BLOOD IN STOOL: 0
BACK PAIN: 0
NAUSEA: 0
CONSTIPATION: 0

## 2020-03-31 ASSESSMENT — PATIENT HEALTH QUESTIONNAIRE - PHQ9
1. LITTLE INTEREST OR PLEASURE IN DOING THINGS: 0
SUM OF ALL RESPONSES TO PHQ QUESTIONS 1-9: 0
SUM OF ALL RESPONSES TO PHQ9 QUESTIONS 1 & 2: 0
2. FEELING DOWN, DEPRESSED OR HOPELESS: 0
SUM OF ALL RESPONSES TO PHQ QUESTIONS 1-9: 0

## 2020-03-31 NOTE — PROGRESS NOTES
Fatty Acids (FISH OIL) 1000 MG CAPS Take 1,000 mg by mouth daily      fluticasone (FLONASE) 50 MCG/ACT nasal spray 2 sprays by Each Nare route daily as needed for Rhinitis      tretinoin (RETIN-A) 0.05 % cream Apply topically daily Indications: 5 times a week Apply topically nightly.  aspirin (ECOTRIN LOW STRENGTH) 81 MG EC tablet Take 81 mg by mouth daily.  b complex vitamins capsule Take 1 capsule by mouth daily       No current facility-administered medications for this visit. No Known Allergies    Health Maintenance   Topic Date Due    Shingles Vaccine (2 of 3) 10/01/2012    Hepatitis C screen  09/18/2020 (Originally 1945)    Annual Wellness Visit (AWV)  09/18/2020    Lipid screen  03/11/2021    Potassium monitoring  03/11/2021    Creatinine monitoring  03/11/2021    Breast cancer screen  08/27/2021    Colon cancer screen colonoscopy  07/11/2022    DTaP/Tdap/Td vaccine (2 - Td) 05/17/2028    DEXA (modify frequency per FRAX score)  Completed    Flu vaccine  Completed    Pneumococcal 65+ years Vaccine  Completed    Hepatitis A vaccine  Aged Out    Hepatitis B vaccine  Aged Out    Hib vaccine  Aged Out    Meningococcal (ACWY) vaccine  Aged Out       Subjective:      Review of Systems   Constitutional: Negative for chills and fever. HENT: Negative for hearing loss. Eyes: Negative for pain and visual disturbance. Respiratory: Negative for cough and shortness of breath. Cardiovascular: Negative for chest pain, palpitations and leg swelling. Gastrointestinal: Positive for heartburn. Negative for abdominal pain, blood in stool, constipation, diarrhea, nausea and vomiting. Endocrine: Negative for cold intolerance, polydipsia and polyuria. Genitourinary: Negative for difficulty urinating, dysuria and hematuria. Musculoskeletal: Negative for arthralgias, back pain, gait problem and neck pain. Skin: Negative for pallor and rash.    Neurological: Negative for dizziness, weakness, numbness and headaches. Hematological: Negative for adenopathy. Does not bruise/bleed easily. Psychiatric/Behavioral: Negative for confusion. The patient is not nervous/anxious. Objective:     Physical Exam  Constitutional:       Appearance: Normal appearance. She is normal weight. HENT:      Head: Normocephalic and atraumatic. Right Ear: External ear normal.      Left Ear: External ear normal.      Nose: Nose normal.      Mouth/Throat:      Mouth: Mucous membranes are moist.   Eyes:      Extraocular Movements: Extraocular movements intact. Conjunctiva/sclera: Conjunctivae normal.   Neck:      Musculoskeletal: Normal range of motion. Cardiovascular:      Rate and Rhythm: Normal rate. Musculoskeletal: Normal range of motion. Skin:     Coloration: Skin is not pale. Findings: No bruising, lesion or rash. Neurological:      General: No focal deficit present. Mental Status: She is alert and oriented to person, place, and time. Gait: Gait normal.   Psychiatric:         Mood and Affect: Mood normal.         Behavior: Behavior normal.         Thought Content: Thought content normal.         Judgment: Judgment normal.        /78 (Site: Left Upper Arm)   Pulse 72   Ht 5' 5\" (1.651 m)   Wt 135 lb 3.2 oz (61.3 kg)   LMP  (LMP Unknown)   BMI 22.50 kg/m²     Assessment:       Diagnosis Orders   1. Essential hypertension     2. Gastroesophageal reflux disease without esophagitis     3. Other hyperlipidemia  atorvastatin (LIPITOR) 40 MG tablet   4. Other fatigue  TSH    T4, Free   5. Postmenopausal  DEXA AXIAL SKELETON W VERTEBRAL FX ASST   6.  Other screening mammogram  PURA DIGITAL SCREEN W OR WO CAD BILATERAL   Pursuant to the emergency declaration under the [place] act in the national emergencies at [facility] authority and [place] preparedness and response supplemental appropriation's act, this virtual visit was conducted, with patient's consent, to

## 2020-04-09 RX ORDER — TRIAMTERENE AND HYDROCHLOROTHIAZIDE 37.5; 25 MG/1; MG/1
TABLET ORAL
Qty: 90 TABLET | Refills: 3 | Status: SHIPPED | OUTPATIENT
Start: 2020-04-09 | End: 2021-04-05

## 2020-04-20 RX ORDER — METOPROLOL SUCCINATE 50 MG/1
TABLET, EXTENDED RELEASE ORAL
Qty: 90 TABLET | Refills: 3 | Status: SHIPPED | OUTPATIENT
Start: 2020-04-20 | End: 2021-04-15

## 2020-07-06 ENCOUNTER — OFFICE VISIT (OUTPATIENT)
Dept: INTERNAL MEDICINE | Age: 75
End: 2020-07-06
Payer: MEDICARE

## 2020-07-06 VITALS
HEART RATE: 56 BPM | BODY MASS INDEX: 23.66 KG/M2 | WEIGHT: 142 LBS | RESPIRATION RATE: 14 BRPM | HEIGHT: 65 IN | SYSTOLIC BLOOD PRESSURE: 108 MMHG | DIASTOLIC BLOOD PRESSURE: 78 MMHG

## 2020-07-06 PROCEDURE — 99214 OFFICE O/P EST MOD 30 MIN: CPT | Performed by: INTERNAL MEDICINE

## 2020-07-06 PROCEDURE — 99214 OFFICE O/P EST MOD 30 MIN: CPT

## 2020-07-06 RX ORDER — M-VIT,TX,IRON,MINS/CALC/FOLIC 27MG-0.4MG
1 TABLET ORAL DAILY
COMMUNITY
End: 2021-04-01

## 2020-07-06 ASSESSMENT — ENCOUNTER SYMPTOMS
SHORTNESS OF BREATH: 0
DIARRHEA: 0
EYE PAIN: 0
COUGH: 0
BLOOD IN STOOL: 0
CONSTIPATION: 0
ABDOMINAL PAIN: 0
VOMITING: 0
BACK PAIN: 0
NAUSEA: 0

## 2020-07-06 NOTE — PROGRESS NOTES
[place] [address]  Dept: [phone]  Dept Fax: [phone]  Loc: [phone]     Edgardo Perdue is a 69 y.o. female who presents today for her medical conditions/complaintsas noted below. Edgardo Perdue is c/o of   Chief Complaint   Patient presents with    Other     Food allergies- x couple of weeks. was doing a peroxide rinse. wasnt helping. Switched to a salt water rinse and has helped a lot. does \"couple times a day\" S/S- \"burning, swelling, white in mouth, dry mouth. \"     Other     Stomatitis    Hypertension         HPI:     Other   This is a new (1-mucositis/stomatitis,  2-Food allergy) problem. The current episode started in the past 7 days. The problem occurs constantly. The problem has been gradually improving. Pertinent negatives include no abdominal pain, arthralgias, chest pain, chills, coughing, fever, headaches, nausea, neck pain, numbness, rash, vomiting or weakness. Hypertension   This is a chronic problem. The current episode started more than 1 year ago. The problem has been waxing and waning since onset. The problem is controlled. Pertinent negatives include no chest pain, headaches, neck pain, palpitations or shortness of breath.        No results found for: LABA1C         No results found for: LABMICR  LDL Cholesterol (mg/dL)   Date Value   03/11/2020 77   03/27/2019 70     LDL Calculated (mg/dL)   Date Value   09/17/2018 78   03/15/2018 58   11/09/2017 77         AST (U/L)   Date Value   03/11/2020 20     ALT (U/L)   Date Value   03/11/2020 12     BUN (mg/dL)   Date Value   03/11/2020 12     BP Readings from Last 3 Encounters:   07/06/20 108/78   03/31/20 136/78   09/18/19 122/70              Past Medical History:   Diagnosis Date    Bean's esophagus     Chronic kidney disease     chronic renal insufficiency    Dizziness     GERD (gastroesophageal reflux disease)  Hyperlipidemia     Hypertension     Measles     Osteoarthritis     Osteoporosis     Seasonal allergies       Past Surgical History:   Procedure Laterality Date    BREAST BIOPSY Right 01/31/03    lipoma    COLONOSCOPY  07/11/12    -moderate sigmoid diverticulosis-otherwise normal-repeat 10 years.  COLONOSCOPY  10/03/02    -mild erythema in the cecum -moderate diverticulosis sigmoid colon-small internal hemorrhoids-repeat 10 years.  DILATION AND CURETTAGE OF UTERUS  07/28/2000    HYSTERECTOMY  04/19/01    XTW-PUI-forrb of adhesions. [address]    UPPER GASTROINTESTINAL ENDOSCOPY  12/16/09    -small hiatal hernia otherwise normal    UPPER GASTROINTESTINAL ENDOSCOPY  08/18/99    -moderate size hiatal hernia-mild to moderate grade I-II distal esophagitis. -mild Schatzki's ring       Family History   Problem Relation Age of Onset    Emphysema Mother     Cancer Mother         breast    COPD Mother     Heart Disease Father     High Blood Pressure Father           Social History     Tobacco Use    Smoking status: Never Smoker    Smokeless tobacco: Never Used   Substance Use Topics    Alcohol use: No         Current Outpatient Medications   Medication Sig Dispense Refill    Multiple Vitamins-Minerals (THERAPEUTIC MULTIVITAMIN-MINERALS) tablet Take 1 tablet by mouth daily      Coenzyme Q10 (CO Q 10) 100 MG CAPS Take 1 tablet by mouth daily      metoprolol succinate (TOPROL XL) 50 MG extended release tablet TAKE 1 TABLET DAILY 90 tablet 3    triamterene-hydrochlorothiazide (MAXZIDE-25) 37.5-25 MG per tablet TAKE 1 TABLET DAILY 90 tablet 3    atorvastatin (LIPITOR) 40 MG tablet Take 1 tablet by mouth daily 90 tablet 3    famotidine (PEPCID) 20 MG tablet Take 20 mg by mouth daily      niacin (SLO-NIACIN) 500 MG extended release tablet Take 500 mg by mouth daily      Calcium Citrate-Vitamin D (CALCIUM CITRATE + D PO) Take 2 tablets by mouth daily      cetirizine (ZYRTEC ALLERGY) 10 MG tablet Take 10 mg by mouth daily      Diphenhydramine-APAP, sleep, (TYLENOL PM EXTRA STRENGTH PO) Take 2 tablets by mouth as needed      Omega-3 Fatty Acids (FISH OIL) 1000 MG CAPS Take 1,000 mg by mouth daily      fluticasone (FLONASE) 50 MCG/ACT nasal spray 2 sprays by Each Nare route daily as needed for Rhinitis      tretinoin (RETIN-A) 0.05 % cream Apply topically daily Indications: 5 times a week Apply topically nightly.  aspirin (ECOTRIN LOW STRENGTH) 81 MG EC tablet Take 81 mg by mouth daily. No current facility-administered medications for this visit. No Known Allergies    Health Maintenance   Topic Date Due    Shingles Vaccine (2 of 3) 10/01/2012    Hepatitis C screen  09/18/2020 (Originally 1945)    Flu vaccine (1) 09/01/2020    Annual Wellness Visit (AWV)  09/18/2020    Lipid screen  03/11/2021    Potassium monitoring  03/11/2021    Creatinine monitoring  03/11/2021    Breast cancer screen  08/27/2021    Colon cancer screen colonoscopy  07/11/2022    DTaP/Tdap/Td vaccine (2 - Td) 05/17/2028    DEXA (modify frequency per FRAX score)  Completed    Pneumococcal 65+ years Vaccine  Completed    Hepatitis A vaccine  Aged Out    Hepatitis B vaccine  Aged Out    Hib vaccine  Aged Out    Meningococcal (ACWY) vaccine  Aged Out       Subjective:      Review of Systems   Constitutional: Negative for chills and fever. HENT: Negative for hearing loss. Eyes: Negative for pain and visual disturbance. Respiratory: Negative for cough and shortness of breath. Cardiovascular: Negative for chest pain, palpitations and leg swelling. Gastrointestinal: Negative for abdominal pain, blood in stool, constipation, diarrhea, nausea and vomiting. Endocrine: Negative for cold intolerance, polydipsia and polyuria. Genitourinary: Negative for difficulty urinating, dysuria and hematuria.    Musculoskeletal: Negative for arthralgias, back pain, gait problem and neck pain. Skin: Negative for pallor and rash. Neurological: Negative for dizziness, weakness, numbness and headaches. Hematological: Negative for adenopathy. Does not bruise/bleed easily. Psychiatric/Behavioral: Negative for confusion. The patient is not nervous/anxious. Objective:     Physical Exam  Vitals signs reviewed. Constitutional:       Appearance: She is well-developed. HENT:      Head: Normocephalic and atraumatic. Eyes:      Pupils: Pupils are equal, round, and reactive to light. Neck:      Musculoskeletal: Neck supple. Cardiovascular:      Rate and Rhythm: Normal rate and regular rhythm. Heart sounds: No murmur. No friction rub. No gallop. Pulmonary:      Effort: Pulmonary effort is normal.      Breath sounds: Normal breath sounds. No wheezing or rales. Abdominal:      General: There is no distension. Palpations: Abdomen is soft. There is no mass. Tenderness: There is no abdominal tenderness. There is no rebound. Musculoskeletal: Normal range of motion. Lymphadenopathy:      Cervical: No cervical adenopathy. Skin:     General: Skin is warm and dry. Findings: No rash. Neurological:      Mental Status: She is alert and oriented to person, place, and time. Cranial Nerves: No cranial nerve deficit (grossly). Psychiatric:         Thought Content: Thought content normal.        /78 (Site: Left Upper Arm, Position: Sitting, Cuff Size: Medium Adult)   Pulse 56   Resp 14   Ht 5' 5\" (1.651 m)   Wt 142 lb (64.4 kg)   LMP  (LMP Unknown)   BMI 23.63 kg/m²     Assessment:       Diagnosis Orders   1. Mucositis oral     2. Essential hypertension     3. Food allergy  Erlinda Scherer, CNP, Allergy, [place]             Plan:       Return if symptoms worsen or fail to improve, for medicare AWV, Hypertension, Hyperlipidemia.     Orders Placed This Encounter   Procedures   [address] [place] [address], Richar Foster, CNP, Allergy, [facility]

## 2020-07-22 ENCOUNTER — OFFICE VISIT (OUTPATIENT)
Dept: FAMILY MEDICINE CLINIC | Age: 75
End: 2020-07-22
Payer: MEDICARE

## 2020-07-22 VITALS
WEIGHT: 140 LBS | HEIGHT: 65 IN | BODY MASS INDEX: 23.32 KG/M2 | SYSTOLIC BLOOD PRESSURE: 120 MMHG | DIASTOLIC BLOOD PRESSURE: 76 MMHG | TEMPERATURE: 97.3 F

## 2020-07-22 PROCEDURE — 99214 OFFICE O/P EST MOD 30 MIN: CPT | Performed by: NURSE PRACTITIONER

## 2020-07-22 PROCEDURE — 99204 OFFICE O/P NEW MOD 45 MIN: CPT | Performed by: NURSE PRACTITIONER

## 2020-07-22 RX ORDER — B-COMPLEX WITH VITAMIN C
1 TABLET ORAL DAILY
Qty: 30 TABLET | Refills: 1 | Status: ON HOLD | OUTPATIENT
Start: 2020-07-22 | End: 2022-08-09

## 2020-07-22 NOTE — PROGRESS NOTES
Symptoms experienced by patient  Runny nose  Yes Circles under eyes Yes Post nasal drip Yes Difficulty breathing No   Stuffy nose No Grumpiness No Hoarseness No Short of breath No   Sniffling  No Fatigue Yes Face pain/pressure No Tight/heavy chest No   Sneezing Yes Nosebleeds No Sore throat No cough Yes   Blowing nose Yes Nasal/sinus surgery No Headache  No Cough up mucus No   Itchy/teary eyes Yes Nasal polyps Yes Upper teeth hurt No Cough up blood No   Itchy ears No Hearing loss Yes Night cough No Chest pain No   Itchy nose Yes Ear problems No Throat clearing Yes Asthma No   Itchy throat No Tubes in ears No Bad breath No Wheezing No   Itchy roof of mouth No Snoring No Bad taste in mouth No Pneumonia No   Nose rubbing Yes Mouth breathing No  Ankle swelling Yes    Overbite No  Difficulty breathing on exertion No    Drooling (toddler) No              Does patient experience? Heartburn and indigestion Yes  Vomiting easily No  Use of antacids (Rolaids, Tums, Maalox) Yes  Regurgitation of stomach contents No  Chronic cough worse after meals No  Chronic cough cough worse after laying down No  Chronic hoarseness or voice change No  Chest pain No  Stomach pain No  Neck pain No  Sore throat-frequent No  Feeling of throat closing or something stuck in throat No  Frequent burps or hiccups yes  Adult onset asthma No  Asthma not relieved with usual treatment No  Anemia No  Asthma worse after meals, alcohol, lying down, bending to tie shoes, or after heartburn No  Chronic sinus disease No    Within the last month, how did the following problems affect the patient?   0=No Problem; 5=Severe Problem    Hoarseness or a problem with your voice 0  Clearing your throat 1  Excess throat mucus or postnasal drip 1  Difficulty swallowing food, liquids, pills 0  Coughing after you ate or after lying down 2  Breathing difficulties or choking episodes 1  Troublesome or annoying cough 0  Sensations of something sticking in your throat or a lump in your throat 0  Heartburn, chest pain, indigestion, or stomach acid coming up 2    Total: 6

## 2020-07-29 NOTE — PROGRESS NOTES
2020     Amadou Kruse (:  1945) is a 69 y.o. female, here for evaluation of the following medical concerns: referred by Dr. Bernice Burgess for allergy evaluation. HPI  Several month history of sores in mouth and peeling skin of lips. She denies tongue or throat swelling, difficulty breathing or swallowing, itching or hives, wheezing or GI concerns associated with the lip symptoms. She has never had similar symptoms in the past.  She has tried lotion, mouthwash and multiple lip balms with gradual improvement. Review of Systems   All other systems reviewed and are negative. Prior to Visit Medications    Medication Sig Taking?  Authorizing Provider   B Complex Vitamins (VITAMIN B COMPLEX) TABS Take 1 tablet by mouth daily Yes BABATUNDE Medrano CNP   Cholecalciferol (VITAMIN D3) 125 MCG (5000 UT) CAPS Take 1 capsule by mouth daily Yes BABATUNDE Medrano CNP   Multiple Vitamins-Minerals (THERAPEUTIC MULTIVITAMIN-MINERALS) tablet Take 1 tablet by mouth daily Yes Historical Provider, MD   Coenzyme Q10 (CO Q 10) 100 MG CAPS Take 1 tablet by mouth daily Yes Historical Provider, MD   metoprolol succinate (TOPROL XL) 50 MG extended release tablet TAKE 1 TABLET DAILY Yes Curly Donald MD   triamterene-hydrochlorothiazide (MAXZIDE-25) 37.5-25 MG per tablet TAKE 1 TABLET DAILY Yes Curly Donald MD   atorvastatin (LIPITOR) 40 MG tablet Take 1 tablet by mouth daily Yes Curly Donald MD   famotidine (PEPCID) 20 MG tablet Take 20 mg by mouth daily Yes Historical Provider, MD   niacin (SLO-NIACIN) 500 MG extended release tablet Take 500 mg by mouth daily Yes Historical Provider, MD   Calcium Citrate-Vitamin D (CALCIUM CITRATE + D PO) Take 2 tablets by mouth daily Yes Historical Provider, MD   cetirizine (ZYRTEC ALLERGY) 10 MG tablet Take 10 mg by mouth daily Yes Historical Provider, MD   Diphenhydramine-APAP, sleep, (TYLENOL PM EXTRA STRENGTH PO) Take 2 tablets by mouth as needed Yes Historical Provider, MD   Omega-3 Fatty Acids (FISH OIL) 1000 MG CAPS Take 1,000 mg by mouth daily Yes Historical Provider, MD   fluticasone (FLONASE) 50 MCG/ACT nasal spray 2 sprays by Each Nare route daily as needed for Rhinitis Yes Historical Provider, MD   tretinoin (RETIN-A) 0.05 % cream Apply topically daily Indications: 5 times a week Apply topically nightly. Yes Historical Provider, MD   aspirin (ECOTRIN LOW STRENGTH) 81 MG EC tablet Take 81 mg by mouth daily. Yes Historical Provider, MD        Social History     Tobacco Use    Smoking status: Never Smoker    Smokeless tobacco: Never Used   Substance Use Topics    Alcohol use: No        Vitals:    07/22/20 1140   BP: 120/76   Site: Right Upper Arm   Position: Sitting   Cuff Size: Medium Adult   Temp: 97.3 °F (36.3 °C)   TempSrc: Temporal   Weight: 140 lb (63.5 kg)   Height: 5' 5\" (1.651 m)     Estimated body mass index is 23.3 kg/m² as calculated from the following:    Height as of this encounter: 5' 5\" (1.651 m). Weight as of this encounter: 140 lb (63.5 kg). Physical Exam  Vitals signs and nursing note reviewed. Constitutional:       General: She is not in acute distress. Appearance: Normal appearance. HENT:      Head: Normocephalic and atraumatic. Right Ear: External ear normal.      Left Ear: External ear normal.      Nose: Nose normal.      Mouth/Throat:      Mouth: Mucous membranes are moist.      Tongue: No lesions. Palate: No lesions. Pharynx: Oropharynx is clear. Uvula midline. No pharyngeal swelling, oropharyngeal exudate, posterior oropharyngeal erythema or uvula swelling. Comments: Dry, peeling skin of lips and perioral skin. Eyes:      Conjunctiva/sclera: Conjunctivae normal.   Neck:      Musculoskeletal: Normal range of motion. Pulmonary:      Effort: Pulmonary effort is normal.   Musculoskeletal: Normal range of motion. Skin:     General: Skin is warm and dry.    Neurological:      General: No focal

## 2020-09-15 ENCOUNTER — HOSPITAL ENCOUNTER (OUTPATIENT)
Dept: MAMMOGRAPHY | Age: 75
Discharge: HOME | End: 2020-09-17
Payer: MEDICARE

## 2020-09-15 PROCEDURE — 77067 SCR MAMMO BI INCL CAD: CPT

## 2020-09-15 PROCEDURE — 77063 BREAST TOMOSYNTHESIS BI: CPT

## 2020-09-18 ENCOUNTER — HOSPITAL ENCOUNTER (OUTPATIENT)
Dept: ULTRASOUND IMAGING | Age: 75
Discharge: HOME | End: 2020-09-20
Payer: MEDICARE

## 2020-09-18 ENCOUNTER — HOSPITAL ENCOUNTER (OUTPATIENT)
Dept: MAMMOGRAPHY | Age: 75
Discharge: HOME | End: 2020-09-20
Payer: MEDICARE

## 2020-09-18 PROCEDURE — 76642 ULTRASOUND BREAST LIMITED: CPT

## 2020-09-18 PROCEDURE — G0279 TOMOSYNTHESIS, MAMMO: HCPCS

## 2020-09-21 ENCOUNTER — TELEPHONE (OUTPATIENT)
Dept: INTERNAL MEDICINE | Age: 75
End: 2020-09-21

## 2020-09-21 NOTE — TELEPHONE ENCOUNTER
Notified patient, She states she would just like to do the follow-up imaging for now. Ordered per dr. Lester Danielle.

## 2020-09-30 ENCOUNTER — OFFICE VISIT (OUTPATIENT)
Dept: INTERNAL MEDICINE | Age: 75
End: 2020-09-30
Payer: MEDICARE

## 2020-09-30 VITALS
WEIGHT: 142 LBS | RESPIRATION RATE: 16 BRPM | BODY MASS INDEX: 23.66 KG/M2 | HEIGHT: 65 IN | HEART RATE: 56 BPM | SYSTOLIC BLOOD PRESSURE: 132 MMHG | DIASTOLIC BLOOD PRESSURE: 72 MMHG

## 2020-09-30 PROCEDURE — 99214 OFFICE O/P EST MOD 30 MIN: CPT

## 2020-09-30 PROCEDURE — 90694 VACC AIIV4 NO PRSRV 0.5ML IM: CPT | Performed by: INTERNAL MEDICINE

## 2020-09-30 PROCEDURE — G0439 PPPS, SUBSEQ VISIT: HCPCS | Performed by: INTERNAL MEDICINE

## 2020-09-30 PROCEDURE — 99214 OFFICE O/P EST MOD 30 MIN: CPT | Performed by: INTERNAL MEDICINE

## 2020-09-30 PROCEDURE — G0008 ADMIN INFLUENZA VIRUS VAC: HCPCS | Performed by: INTERNAL MEDICINE

## 2020-09-30 RX ORDER — FEXOFENADINE HCL 180 MG/1
180 TABLET ORAL DAILY
COMMUNITY

## 2020-09-30 RX ORDER — AMPICILLIN TRIHYDRATE 250 MG
1000 CAPSULE ORAL DAILY
COMMUNITY
End: 2022-04-05

## 2020-09-30 RX ORDER — IBUPROFEN 200 MG
200 TABLET ORAL NIGHTLY PRN
COMMUNITY

## 2020-09-30 ASSESSMENT — PATIENT HEALTH QUESTIONNAIRE - PHQ9
SUM OF ALL RESPONSES TO PHQ9 QUESTIONS 1 & 2: 0
2. FEELING DOWN, DEPRESSED OR HOPELESS: 0
SUM OF ALL RESPONSES TO PHQ QUESTIONS 1-9: 0
1. LITTLE INTEREST OR PLEASURE IN DOING THINGS: 0
SUM OF ALL RESPONSES TO PHQ QUESTIONS 1-9: 0

## 2020-09-30 ASSESSMENT — ENCOUNTER SYMPTOMS
EYE PAIN: 0
SHORTNESS OF BREATH: 0
VOMITING: 0
COUGH: 0
BLOOD IN STOOL: 0
HEARTBURN: 1
CONSTIPATION: 0
BACK PAIN: 0
NAUSEA: 0
DIARRHEA: 0
ABDOMINAL PAIN: 0

## 2020-09-30 ASSESSMENT — LIFESTYLE VARIABLES: HOW OFTEN DO YOU HAVE A DRINK CONTAINING ALCOHOL: 0

## 2020-09-30 NOTE — PROGRESS NOTES
Have you had an allergic reaction to the flu (influenza) shot? no  Are you allergic to eggs or any component of the flu vaccine? no  Do you have a history of Guillain-Foxworth Syndrome (GBS), which is paralysis after receiving the flu vaccine? no  Are you feeling well today? yes  Flu vaccine given as ordered. Patient tolerated it well. No questions re: VIS information. Medicare Annual Wellness Visit  Name: Amairani Sullivan Date: 2020   MRN: [id number] Sex: Female   Age: 70 y.o. Ethnicity: Non-/Non    : 1945 Race: Lisa Dotson is here for Medicare AWV; Hypertension (6m); Hyperlipidemia; and Gastroesophageal Reflux    Screenings for behavioral, psychosocial and functional/safety risks, and cognitive dysfunction are all negative except as indicated below. These results, as well as other patient data from the [address] form, are documented in Flowsheets linked to this Encounter. No Known Allergies      Prior to Visit Medications    Medication Sig Taking?  Authorizing Provider   fexofenadine (ALLEGRA ALLERGY) 180 MG tablet Take 180 mg by mouth daily Yes Historical Provider, MD   Cinnamon 500 MG CAPS Take 1,000 mg by mouth daily Yes Historical Provider, MD   ibuprofen (ADVIL;MOTRIN) 200 MG tablet Take 200 mg by mouth nightly as needed for Pain Yes Historical Provider, MD   B Complex Vitamins (VITAMIN B COMPLEX) TABS Take 1 tablet by mouth daily Yes BABATUNDE Rubalcava CNP   Cholecalciferol (VITAMIN D3) 125 MCG (5000 UT) CAPS Take 1 capsule by mouth daily Yes BABATUNDE Rubalcava CNP   Multiple Vitamins-Minerals (THERAPEUTIC MULTIVITAMIN-MINERALS) tablet Take 1 tablet by mouth daily Yes Historical Provider, MD   Coenzyme Q10 (CO Q 10) 100 MG CAPS Take 1 tablet by mouth daily Yes Historical Provider, MD   metoprolol succinate (TOPROL XL) 50 MG extended release tablet TAKE 1 TABLET DAILY Yes Yobany Brian MD   triamterene-hydrochlorothiazide otherwise normal    UPPER GASTROINTESTINAL ENDOSCOPY  08/18/99    -moderate size hiatal hernia-mild to moderate grade I-II distal esophagitis. -mild Schatzki's ring         Family History   Problem Relation Age of Onset    Emphysema Mother     Cancer Mother         breast    COPD Mother     Heart Disease Father     High Blood Pressure Father        CareTeam (Including outside providers/suppliers regularly involved in providing care):   Patient Care Team:  Orlando Hurtado MD as PCP - General (Internal Medicine)  Orlando Hurtado MD as PCP - [facility] Empaneled Provider    Wt Readings from Last 3 Encounters:   09/30/20 142 lb (64.4 kg)   07/22/20 140 lb (63.5 kg)   07/06/20 142 lb (64.4 kg)     Vitals:    09/30/20 0821   BP: 132/72   Site: Left Upper Arm   Position: Sitting   Cuff Size: Medium Adult   Pulse: 56   Resp: 16   Weight: 142 lb (64.4 kg)   Height: 5' 5\" (1.651 m)     Body mass index is 23.63 kg/m². Based upon direct observation of the patient, evaluation of cognition reveals none. Patient's complete Health Risk Assessment and screening values have been reviewed and are found in Flowsheets. The following problems were reviewed today and where indicated follow up appointments were made and/or referrals ordered.     Positive Risk Factor Screenings with Interventions:     Hearing/Vision:  No exam data present  Hearing/Vision  Do you or your family notice any trouble with your hearing?: (!) Yes  Do you have difficulty driving, watching TV, or doing any of your daily activities because of your eyesight?: No  Have you had an eye exam within the past year?: Yes  Hearing/Vision Interventions:  · Hearing concerns:  patient declines any further evaluation/treatment for hearing issues    Safety:  Safety  Do you have working smoke detectors?: Yes  Have all throw rugs been removed or fastened?: (!) No  Do you have non-slip mats or surfaces in all bathtubs/showers?: Yes  Do all of your stairways have a railing or banister?: (na)  Are your doorways, halls and stairs free of clutter?: Yes  Do you always fasten your seatbelt when you are in a car?: Yes  Safety Interventions:  · Home safety tips provided    Personalized Preventive Plan   Current Health Maintenance Status  Immunization History   Administered Date(s) Administered    Influenza Virus Vaccine 11/08/2012, 10/21/2014, 11/01/2016    Influenza, High Dose (Fluzone 65 yrs and older) 10/13/2016, 11/09/2017, 09/17/2018    Influenza, Quadv, adjuvanted, 65 yrs +, IM, PF (Fluad) 09/30/2020    Influenza, Triv, inactivated, subunit, adjuvanted, IM (Fluad 65 yrs and older) 09/18/2019    Pneumococcal Conjugate 13-valent ([id number]) 11/09/2017    Pneumococcal Polysaccharide ([id number]) 12/12/2012    Tdap (Boostrix, Adacel) 05/17/2018    Zoster Live (Zostavax) 08/06/2012        Health Maintenance   Topic Date Due    Hepatitis C screen  1945    Shingles Vaccine (2 of 3) 10/01/2012    Annual Wellness Visit (AWV)  05/29/2019    Lipid screen  03/11/2021    Potassium monitoring  03/11/2021    Creatinine monitoring  03/11/2021    Colon cancer screen colonoscopy  07/11/2022    Breast cancer screen  09/18/2022    DTaP/Tdap/Td vaccine (2 - Td) 05/17/2028    DEXA (modify frequency per FRAX score)  Completed    Flu vaccine  Completed    Pneumococcal 65+ years Vaccine  Completed    Hepatitis A vaccine  Aged Out    Hepatitis B vaccine  Aged Out    Hib vaccine  Aged Out    Meningococcal (ACWY) vaccine  Aged Out     Recommendations for Triductor Due: see orders and patient instructions/AVS.  . Recommended screening schedule for the next 5-10 years is provided to the patient in written form: see Patient Instructions/AVS.    Thai TENORIO Cea, LPN, 8/36/8365, performed the documented evaluation under the direct supervision of the attending physician. I, Dr. Lester Danielle, directly supervised the performance of this Medicare annual wellness visit.

## 2020-09-30 NOTE — PROGRESS NOTES
[place] [address]  Dept: [phone]  Dept Fax: [phone]  Loc: [phone]     Del Barney is a 70 y.o. female who presents today for her medical conditions/complaintsas noted below. Del Barney is c/o of   Chief Complaint   Patient presents with    Medicare AWV    Hypertension     6m    Hyperlipidemia    Gastroesophageal Reflux         HPI:     Hypertension   This is a chronic (essential htn) problem. The current episode started more than 1 year ago. The problem has been waxing and waning since onset. The problem is controlled. Pertinent negatives include no chest pain, headaches, neck pain, palpitations or shortness of breath. Hyperlipidemia   This is a chronic problem. The current episode started more than 1 year ago. The problem is controlled. Recent lipid tests were reviewed and are variable. Pertinent negatives include no chest pain or shortness of breath. Gastroesophageal Reflux   She complains of heartburn. She reports no abdominal pain, no chest pain, no coughing or no nausea. This is a recurrent problem. The current episode started more than 1 year ago. The problem occurs rarely. The problem has been waxing and waning. Other   This is a recurrent (4-General medical exam) problem. The current episode started today. The problem occurs intermittently. The problem has been waxing and waning. Pertinent negatives include no abdominal pain, arthralgias, chest pain, chills, coughing, fever, headaches, nausea, neck pain, numbness, rash, vomiting or weakness.        No results found for: LABA1C         No results found for: LABMICR  LDL Cholesterol (mg/dL)   Date Value   03/11/2020 77   03/27/2019 70     LDL Calculated (mg/dL)   Date Value   09/17/2018 78   03/15/2018 58   11/09/2017 77         AST (U/L)   Date Value   03/11/2020 20     ALT (U/L)   Date Value   03/11/2020 12 (ACWY) vaccine  Aged Out       Subjective:      Review of Systems   Constitutional: Negative for chills and fever. HENT: Negative for hearing loss. Eyes: Negative for pain and visual disturbance. Respiratory: Negative for cough and shortness of breath. Cardiovascular: Negative for chest pain, palpitations and leg swelling. Gastrointestinal: Positive for heartburn. Negative for abdominal pain, blood in stool, constipation, diarrhea, nausea and vomiting. Endocrine: Negative for cold intolerance, polydipsia and polyuria. Genitourinary: Negative for difficulty urinating, dysuria and hematuria. Musculoskeletal: Negative for arthralgias, back pain, gait problem and neck pain. Skin: Negative for pallor and rash. Neurological: Negative for dizziness, weakness, numbness and headaches. Hematological: Negative for adenopathy. Does not bruise/bleed easily. Psychiatric/Behavioral: Negative for confusion. The patient is not nervous/anxious. Objective:     Physical Exam  Vitals signs reviewed. Constitutional:       Appearance: She is well-developed. HENT:      Head: Normocephalic and atraumatic. Eyes:      Pupils: Pupils are equal, round, and reactive to light. Neck:      Musculoskeletal: Neck supple. Cardiovascular:      Rate and Rhythm: Normal rate and regular rhythm. Heart sounds: No murmur. No friction rub. No gallop. Pulmonary:      Effort: Pulmonary effort is normal.      Breath sounds: Normal breath sounds. No wheezing or rales. Abdominal:      General: There is no distension. Palpations: Abdomen is soft. There is no mass. Tenderness: There is no abdominal tenderness. There is no rebound. Musculoskeletal: Normal range of motion. Lymphadenopathy:      Cervical: No cervical adenopathy. Skin:     General: Skin is warm and dry. Findings: No rash. Neurological:      Mental Status: She is alert and oriented to person, place, and time.       Cranial Nerves:

## 2020-09-30 NOTE — PATIENT INSTRUCTIONS
Personalized Preventive Plan for Elisabeth Elizalde - 9/30/2020  Medicare offers a range of preventive health benefits. Some of the tests and screenings are paid in full while other may be subject to a deductible, co-insurance, and/or copay. Some of these benefits include a comprehensive review of your medical history including lifestyle, illnesses that may run in your family, and various assessments and screenings as appropriate. After reviewing your medical record and screening and assessments performed today your provider may have ordered immunizations, labs, imaging, and/or referrals for you. A list of these orders (if applicable) as well as your Preventive Care list are included within your After Visit Summary for your review. Other Preventive Recommendations:    · A preventive eye exam performed by an eye specialist is recommended every 1-2 years to screen for glaucoma; cataracts, macular degeneration, and other eye disorders. · A preventive dental visit is recommended every 6 months. · Try to get at least 150 minutes of exercise per week or 10,000 steps per day on a pedometer . · Order or download the FREE \"Exercise & Physical Activity: Your Everyday Guide\" from [employer]. Call 4-746.150.1991 or search [employer] online. · You need 0200-1282 mg of calcium and 6821-5778 IU of vitamin D per day. It is possible to meet your calcium requirement with diet alone, but a vitamin D supplement is usually necessary to meet this goal.  · When exposed to the sun, use a sunscreen that protects against both UVA and UVB radiation with an SPF of 30 or greater. Reapply every 2 to 3 hours or after sweating, drying off with a towel, or swimming. · Always wear a seat belt when traveling in a car. Always wear a helmet when riding a bicycle or motorcycle.

## 2020-10-06 ENCOUNTER — HOSPITAL ENCOUNTER (OUTPATIENT)
Dept: BONE DENSITY | Age: 75
Discharge: HOME | End: 2020-10-08
Payer: MEDICARE

## 2020-10-06 PROCEDURE — 77085 DXA BONE DENSITY AXL VRT FX: CPT

## 2021-02-15 ENCOUNTER — IMMUNIZATION (OUTPATIENT)
Dept: LAB | Age: 76
End: 2021-02-15
Payer: MEDICARE

## 2021-02-15 PROCEDURE — 91301 COVID-19, MODERNA VACCINE 100MCG/0.5ML DOSE: CPT

## 2021-03-15 ENCOUNTER — IMMUNIZATION (OUTPATIENT)
Dept: LAB | Age: 76
End: 2021-03-15
Payer: MEDICARE

## 2021-03-15 PROCEDURE — 91301 COVID-19, MODERNA VACCINE 100MCG/0.5ML DOSE: CPT

## 2021-03-22 ENCOUNTER — HOSPITAL ENCOUNTER (OUTPATIENT)
Dept: MAMMOGRAPHY | Age: 76
Discharge: HOME | End: 2021-03-24
Payer: MEDICARE

## 2021-03-22 ENCOUNTER — HOSPITAL ENCOUNTER (OUTPATIENT)
Dept: INTERVENTIONAL RADIOLOGY/VASCULAR | Age: 76
Discharge: HOME | End: 2021-03-24
Payer: MEDICARE

## 2021-03-22 ENCOUNTER — HOSPITAL ENCOUNTER (OUTPATIENT)
Dept: LAB | Age: 76
Discharge: HOME | End: 2021-03-22
Payer: MEDICARE

## 2021-03-22 DIAGNOSIS — R53.83 OTHER FATIGUE: ICD-10-CM

## 2021-03-22 DIAGNOSIS — E78.49 OTHER HYPERLIPIDEMIA: ICD-10-CM

## 2021-03-22 DIAGNOSIS — R92.8 ABNORMAL MAMMOGRAM: ICD-10-CM

## 2021-03-22 DIAGNOSIS — Z00.00 ROUTINE GENERAL MEDICAL EXAMINATION AT A HEALTH CARE FACILITY: ICD-10-CM

## 2021-03-22 DIAGNOSIS — I10 ESSENTIAL HYPERTENSION: ICD-10-CM

## 2021-03-22 LAB
ALBUMIN SERPL-MCNC: 4.2 G/DL (ref 3.5–5.2)
ALBUMIN/GLOBULIN RATIO: 1.4 (ref 1–2.5)
ALP BLD-CCNC: 75 U/L (ref 35–104)
ALT SERPL-CCNC: 11 U/L (ref 5–33)
ANION GAP SERPL CALCULATED.3IONS-SCNC: 9 MMOL/L (ref 9–17)
AST SERPL-CCNC: 18 U/L
BILIRUB SERPL-MCNC: 0.65 MG/DL (ref 0.3–1.2)
BUN BLDV-MCNC: 9 MG/DL (ref 8–23)
BUN/CREAT BLD: 14 (ref 9–20)
CALCIUM SERPL-MCNC: 9.8 MG/DL (ref 8.6–10.4)
CHLORIDE BLD-SCNC: 95 MMOL/L (ref 98–107)
CHOLESTEROL/HDL RATIO: 3.2
CHOLESTEROL: 128 MG/DL
CO2: 28 MMOL/L (ref 20–31)
CREAT SERPL-MCNC: 0.63 MG/DL (ref 0.5–0.9)
GFR AFRICAN AMERICAN: >60 ML/MIN
GFR NON-AFRICAN AMERICAN: >60 ML/MIN
GFR SERPL CREATININE-BSD FRML MDRD: ABNORMAL ML/MIN/{1.73_M2}
GFR SERPL CREATININE-BSD FRML MDRD: ABNORMAL ML/MIN/{1.73_M2}
GLUCOSE BLD-MCNC: 99 MG/DL (ref 70–99)
HDLC SERPL-MCNC: 40 MG/DL
LDL CHOLESTEROL: 73 MG/DL (ref 0–130)
POTASSIUM SERPL-SCNC: 3.9 MMOL/L (ref 3.7–5.3)
SODIUM BLD-SCNC: 132 MMOL/L (ref 135–144)
THYROXINE, FREE: 1.21 NG/DL (ref 0.93–1.7)
TOTAL PROTEIN: 7.1 G/DL (ref 6.4–8.3)
TRIGL SERPL-MCNC: 74 MG/DL
TSH SERPL DL<=0.05 MIU/L-ACNC: 2.51 MIU/L (ref 0.3–5)
VLDLC SERPL CALC-MCNC: ABNORMAL MG/DL (ref 1–30)

## 2021-03-22 PROCEDURE — G0279 TOMOSYNTHESIS, MAMMO: HCPCS

## 2021-03-22 PROCEDURE — 36415 COLL VENOUS BLD VENIPUNCTURE: CPT

## 2021-03-22 PROCEDURE — 84443 ASSAY THYROID STIM HORMONE: CPT

## 2021-03-22 PROCEDURE — 76642 ULTRASOUND BREAST LIMITED: CPT

## 2021-03-22 PROCEDURE — 80061 LIPID PANEL: CPT

## 2021-03-22 PROCEDURE — 80053 COMPREHEN METABOLIC PANEL: CPT

## 2021-03-22 PROCEDURE — 84439 ASSAY OF FREE THYROXINE: CPT

## 2021-03-29 DIAGNOSIS — E78.49 OTHER HYPERLIPIDEMIA: ICD-10-CM

## 2021-03-29 RX ORDER — ATORVASTATIN CALCIUM 40 MG/1
TABLET, FILM COATED ORAL
Qty: 90 TABLET | Refills: 3 | Status: SHIPPED | OUTPATIENT
Start: 2021-03-29 | End: 2022-03-03

## 2021-04-01 ENCOUNTER — OFFICE VISIT (OUTPATIENT)
Dept: INTERNAL MEDICINE | Age: 76
End: 2021-04-01
Payer: MEDICARE

## 2021-04-01 VITALS
HEIGHT: 65 IN | BODY MASS INDEX: 24.49 KG/M2 | WEIGHT: 147 LBS | DIASTOLIC BLOOD PRESSURE: 70 MMHG | SYSTOLIC BLOOD PRESSURE: 126 MMHG | RESPIRATION RATE: 16 BRPM | HEART RATE: 52 BPM

## 2021-04-01 DIAGNOSIS — N64.89 BREAST ASYMMETRY: ICD-10-CM

## 2021-04-01 DIAGNOSIS — E78.49 OTHER HYPERLIPIDEMIA: ICD-10-CM

## 2021-04-01 DIAGNOSIS — I10 ESSENTIAL HYPERTENSION: Primary | ICD-10-CM

## 2021-04-01 DIAGNOSIS — Z12.31 OTHER SCREENING MAMMOGRAM: ICD-10-CM

## 2021-04-01 DIAGNOSIS — K21.9 GASTROESOPHAGEAL REFLUX DISEASE WITHOUT ESOPHAGITIS: ICD-10-CM

## 2021-04-01 PROCEDURE — 99214 OFFICE O/P EST MOD 30 MIN: CPT | Performed by: INTERNAL MEDICINE

## 2021-04-01 ASSESSMENT — ENCOUNTER SYMPTOMS
NAUSEA: 0
EYE PAIN: 0
CONSTIPATION: 0
COUGH: 0
SHORTNESS OF BREATH: 0
BACK PAIN: 0
VOMITING: 0
HEARTBURN: 1
DIARRHEA: 0
BLOOD IN STOOL: 0
ABDOMINAL PAIN: 0

## 2021-04-01 ASSESSMENT — PATIENT HEALTH QUESTIONNAIRE - PHQ9: SUM OF ALL RESPONSES TO PHQ QUESTIONS 1-9: 0

## 2021-04-05 RX ORDER — TRIAMTERENE AND HYDROCHLOROTHIAZIDE 37.5; 25 MG/1; MG/1
TABLET ORAL
Qty: 90 TABLET | Refills: 3 | Status: SHIPPED | OUTPATIENT
Start: 2021-04-05 | End: 2022-03-31

## 2021-04-15 RX ORDER — METOPROLOL SUCCINATE 50 MG/1
TABLET, EXTENDED RELEASE ORAL
Qty: 90 TABLET | Refills: 3 | Status: SHIPPED | OUTPATIENT
Start: 2021-04-15 | End: 2022-04-11

## 2021-04-15 NOTE — TELEPHONE ENCOUNTER
Pharmacy requesting a refill of the below medication which has been pended for you:     Requested Prescriptions     Pending Prescriptions Disp Refills    metoprolol succinate (TOPROL XL) 50 MG extended release tablet [Pharmacy Med Name: METOPROLOL SUCCINATE ER TABS 50MG] 90 tablet 3     Sig: TAKE 1 TABLET DAILY       Last Appointment Date: 4/1/2021  Next Appointment Date: 10/1/2021    No Known Allergies

## 2021-09-20 ENCOUNTER — HOSPITAL ENCOUNTER (OUTPATIENT)
Dept: INTERVENTIONAL RADIOLOGY/VASCULAR | Age: 76
Discharge: HOME | End: 2021-09-22
Payer: MEDICARE

## 2021-09-20 ENCOUNTER — HOSPITAL ENCOUNTER (OUTPATIENT)
Dept: MAMMOGRAPHY | Age: 76
Discharge: HOME | End: 2021-09-22
Payer: MEDICARE

## 2021-09-20 VITALS — WEIGHT: 142 LBS | BODY MASS INDEX: 23.66 KG/M2 | HEIGHT: 65 IN

## 2021-09-20 DIAGNOSIS — N64.89 BREAST ASYMMETRY: ICD-10-CM

## 2021-09-20 DIAGNOSIS — Z12.31 OTHER SCREENING MAMMOGRAM: ICD-10-CM

## 2021-09-20 PROCEDURE — G0279 TOMOSYNTHESIS, MAMMO: HCPCS

## 2021-09-20 PROCEDURE — 76642 ULTRASOUND BREAST LIMITED: CPT

## 2021-09-28 NOTE — PROGRESS NOTES
Comfort care visit and assessment. Pt with labored resp rate of 40/min and
clear nonverbal indicators of pain. Spoke with RN who will medicate now prior
transport home to hospice with same day admission. Pt with rebreather mask on.
Spoke to RN re: changing over to nc and providing oral care prior to
transport. [place] [address]  Dept: [phone]  Dept Fax: [phone]  Loc: [phone]     Sonia Avila is a 70 y.o. female who presents today for her medical conditions/complaintsas noted below. Sonia Avila is c/o of   Chief Complaint   Patient presents with    Hypertension    Hyperlipidemia    Gastroesophageal Reflux         HPI:     Hypertension  This is a chronic (essential htn) problem. The current episode started more than 1 year ago. The problem has been waxing and waning since onset. The problem is controlled. Pertinent negatives include no chest pain, headaches, neck pain, palpitations or shortness of breath. Hyperlipidemia  This is a chronic problem. The current episode started more than 1 year ago. The problem is controlled. Recent lipid tests were reviewed and are variable. Pertinent negatives include no chest pain or shortness of breath. Gastroesophageal Reflux  She complains of heartburn. She reports no abdominal pain, no chest pain, no coughing or no nausea. This is a recurrent problem. The current episode started more than 1 year ago. The problem occurs rarely. The problem has been waxing and waning.        No results found for: LABA1C         No results found for: LABMICR  LDL Cholesterol (mg/dL)   Date Value   03/22/2021 73   03/11/2020 77   03/27/2019 70     LDL Calculated (mg/dL)   Date Value   09/17/2018 78   03/15/2018 58   11/09/2017 77         AST (U/L)   Date Value   03/22/2021 18     ALT (U/L)   Date Value   03/22/2021 11     BUN (mg/dL)   Date Value   03/22/2021 9     BP Readings from Last 3 Encounters:   04/01/21 126/70   09/30/20 132/72   07/22/20 120/76              Past Medical History:   Diagnosis Date    Bean's esophagus     Chronic kidney disease     chronic renal insufficiency    Dizziness     GERD (gastroesophageal reflux disease)     Hyperlipidemia  Hypertension     Measles     Osteoarthritis     Osteoporosis     Seasonal allergies       Past Surgical History:   Procedure Laterality Date    BREAST BIOPSY Right 01/31/03    lipoma    COLONOSCOPY  07/11/12    -moderate sigmoid diverticulosis-otherwise normal-repeat 10 years.  COLONOSCOPY  10/03/02    -mild erythema in the cecum -moderate diverticulosis sigmoid colon-small internal hemorrhoids-repeat 10 years.  DILATION AND CURETTAGE OF UTERUS  07/28/2000    HYSTERECTOMY  04/19/01    ABL-IDO-euzep of adhesions. [id number] [facility]    UPPER GASTROINTESTINAL ENDOSCOPY  12/16/09    -small hiatal hernia otherwise normal    UPPER GASTROINTESTINAL ENDOSCOPY  08/18/99    -moderate size hiatal hernia-mild to moderate grade I-II distal esophagitis. -mild Schatzki's ring       Family History   Problem Relation Age of Onset    Emphysema Mother     Cancer Mother         breast    COPD Mother     Heart Disease Father     High Blood Pressure Father           Social History     Tobacco Use    Smoking status: Never Smoker    Smokeless tobacco: Never Used   Substance Use Topics    Alcohol use: No         Current Outpatient Medications   Medication Sig Dispense Refill    atorvastatin (LIPITOR) 40 MG tablet TAKE 1 TABLET DAILY 90 tablet 3    fexofenadine (ALLEGRA ALLERGY) 180 MG tablet Take 180 mg by mouth daily      Cinnamon 500 MG CAPS Take 1,000 mg by mouth daily      B Complex Vitamins (VITAMIN B COMPLEX) TABS Take 1 tablet by mouth daily 30 tablet 1    Cholecalciferol (VITAMIN D3) 125 MCG (5000 UT) CAPS Take 1 capsule by mouth daily 30 capsule 1    metoprolol succinate (TOPROL XL) 50 MG extended release tablet TAKE 1 TABLET DAILY 90 tablet 3    triamterene-hydrochlorothiazide (MAXZIDE-25) 37.5-25 MG per tablet TAKE 1 TABLET DAILY 90 tablet 3    famotidine (PEPCID) 20 MG tablet Take 20 mg by mouth daily      niacin (SLO-NIACIN) 500 MG extended release tablet Take 500 mg by mouth daily      Calcium Citrate-Vitamin D (CALCIUM CITRATE + D PO) Take 2 tablets by mouth daily      Omega-3 Fatty Acids (FISH OIL) 1000 MG CAPS Take 1,000 mg by mouth daily      fluticasone (FLONASE) 50 MCG/ACT nasal spray 2 sprays by Each Nare route daily as needed for Rhinitis      aspirin (ECOTRIN LOW STRENGTH) 81 MG EC tablet Take 81 mg by mouth daily.  ibuprofen (ADVIL;MOTRIN) 200 MG tablet Take 200 mg by mouth nightly as needed for Pain      Diphenhydramine-APAP, sleep, (TYLENOL PM EXTRA STRENGTH PO) Take 2 tablets by mouth as needed      tretinoin (RETIN-A) 0.05 % cream Apply topically daily Indications: 5 times a week Apply topically nightly. No current facility-administered medications for this visit. No Known Allergies    Health Maintenance   Topic Date Due    Hepatitis C screen  Never done    Shingles Vaccine (2 of 3) 10/01/2012    Annual Wellness Visit (AWV)  10/01/2021    Lipid screen  03/22/2022    Potassium monitoring  03/22/2022    Creatinine monitoring  03/22/2022    Colon cancer screen colonoscopy  07/11/2022    Breast cancer screen  03/22/2023    DTaP/Tdap/Td vaccine (2 - Td) 05/17/2028    DEXA (modify frequency per FRAX score)  Completed    Flu vaccine  Completed    Pneumococcal 65+ years Vaccine  Completed    COVID-19 Vaccine  Completed    Hepatitis A vaccine  Aged Out    Hepatitis B vaccine  Aged Out    Hib vaccine  Aged Out    Meningococcal (ACWY) vaccine  Aged Out       Subjective:      Review of Systems   Constitutional: Negative for chills and fever. HENT: Negative for hearing loss. Eyes: Negative for pain and visual disturbance. Respiratory: Negative for cough and shortness of breath. Cardiovascular: Negative for chest pain, palpitations and leg swelling. Gastrointestinal: Positive for heartburn. Negative for abdominal pain, blood in stool, constipation, diarrhea, nausea and vomiting.    Endocrine: Negative for cold intolerance, polydipsia and polyuria. Genitourinary: Negative for difficulty urinating, dysuria and hematuria. Musculoskeletal: Negative for arthralgias, back pain, gait problem and neck pain. Skin: Negative for pallor and rash. Neurological: Negative for dizziness, weakness, numbness and headaches. Hematological: Negative for adenopathy. Does not bruise/bleed easily. Psychiatric/Behavioral: Negative for confusion. The patient is not nervous/anxious. Objective:     Physical Exam  Vitals signs reviewed. Constitutional:       Appearance: She is well-developed. HENT:      Head: Normocephalic and atraumatic. Eyes:      Pupils: Pupils are equal, round, and reactive to light. Neck:      Musculoskeletal: Neck supple. Cardiovascular:      Rate and Rhythm: Normal rate and regular rhythm. Heart sounds: No murmur. No friction rub. No gallop. Pulmonary:      Effort: Pulmonary effort is normal.      Breath sounds: Normal breath sounds. No wheezing or rales. Abdominal:      General: There is no distension. Palpations: Abdomen is soft. There is no mass. Tenderness: There is no abdominal tenderness. There is no rebound. Musculoskeletal: Normal range of motion. Lymphadenopathy:      Cervical: No cervical adenopathy. Skin:     General: Skin is warm and dry. Findings: No rash. Neurological:      Mental Status: She is alert and oriented to person, place, and time. Cranial Nerves: No cranial nerve deficit (grossly). Psychiatric:         Thought Content: Thought content normal.        /70 (Site: Right Upper Arm, Position: Sitting, Cuff Size: Medium Adult)   Pulse 52   Resp 16   Ht 5' 5\" (1.651 m)   Wt 147 lb (66.7 kg)   LMP  (LMP Unknown)   BMI 24.46 kg/m²     Assessment:       Diagnosis Orders   1. Essential hypertension     2. Gastroesophageal reflux disease without esophagitis     3. Other hyperlipidemia     4.  Other screening mammogram  [facility]   5. Breast asymmetry  [facility]   I reviewed multiple test results for this appointment. 3/22/2021 normal glucose 99.    3/22/2021 normal TSH 2.51.    3/22/2021 normal total cholesterol 128. Patient told to continue Lipitor and other medications. Plan:       No follow-ups on file. Orders Placed This Encounter   Procedures    [facility]     Standing Status:   Future     Standing Expiration Date:   10/1/2022     No orders of the defined types were placed in this encounter. Patientgiven educational materials - see patient instructions. Discussed use, benefit,and side effects of prescribed medications. All patient questions answered. Ptvoiced understanding. Reviewed health maintenance. Instructed to continue currentmedications, diet and exercise. Patient agreed with treatment plan. Follow up asdirected.      Electronically signed by El Mcqueen MD on 4/1/2021 at 9:17 AM

## 2021-10-01 ENCOUNTER — OFFICE VISIT (OUTPATIENT)
Dept: INTERNAL MEDICINE | Age: 76
End: 2021-10-01
Payer: MEDICARE

## 2021-10-01 VITALS
DIASTOLIC BLOOD PRESSURE: 78 MMHG | BODY MASS INDEX: 24.16 KG/M2 | WEIGHT: 145 LBS | SYSTOLIC BLOOD PRESSURE: 122 MMHG | RESPIRATION RATE: 16 BRPM | HEIGHT: 65 IN | HEART RATE: 63 BPM

## 2021-10-01 DIAGNOSIS — I10 PRIMARY HYPERTENSION: ICD-10-CM

## 2021-10-01 DIAGNOSIS — Z00.00 MEDICARE ANNUAL WELLNESS VISIT, SUBSEQUENT: ICD-10-CM

## 2021-10-01 DIAGNOSIS — R92.8 ABNORMAL MAMMOGRAM: ICD-10-CM

## 2021-10-01 DIAGNOSIS — K21.9 GASTROESOPHAGEAL REFLUX DISEASE WITHOUT ESOPHAGITIS: ICD-10-CM

## 2021-10-01 DIAGNOSIS — E78.49 OTHER HYPERLIPIDEMIA: ICD-10-CM

## 2021-10-01 DIAGNOSIS — Z00.00 ROUTINE GENERAL MEDICAL EXAMINATION AT A HEALTH CARE FACILITY: Primary | ICD-10-CM

## 2021-10-01 PROCEDURE — G0439 PPPS, SUBSEQ VISIT: HCPCS | Performed by: INTERNAL MEDICINE

## 2021-10-01 PROCEDURE — G0008 ADMIN INFLUENZA VIRUS VAC: HCPCS | Performed by: INTERNAL MEDICINE

## 2021-10-01 PROCEDURE — PBSHW INFLUENZA, QUADV, ADJUVANTED, 65 YRS +, IM, PF, PREFILL SYR, 0.5ML (FLUAD): Performed by: INTERNAL MEDICINE

## 2021-10-01 PROCEDURE — 99214 OFFICE O/P EST MOD 30 MIN: CPT | Performed by: INTERNAL MEDICINE

## 2021-10-01 PROCEDURE — G0463 HOSPITAL OUTPT CLINIC VISIT: HCPCS | Performed by: INTERNAL MEDICINE

## 2021-10-01 PROCEDURE — 99397 PER PM REEVAL EST PAT 65+ YR: CPT | Performed by: INTERNAL MEDICINE

## 2021-10-01 SDOH — ECONOMIC STABILITY: FOOD INSECURITY: WITHIN THE PAST 12 MONTHS, THE FOOD YOU BOUGHT JUST DIDN'T LAST AND YOU DIDN'T HAVE MONEY TO GET MORE.: NEVER TRUE

## 2021-10-01 SDOH — ECONOMIC STABILITY: FOOD INSECURITY: WITHIN THE PAST 12 MONTHS, YOU WORRIED THAT YOUR FOOD WOULD RUN OUT BEFORE YOU GOT MONEY TO BUY MORE.: NEVER TRUE

## 2021-10-01 ASSESSMENT — ENCOUNTER SYMPTOMS
ABDOMINAL PAIN: 0
HEARTBURN: 1
CONSTIPATION: 0
SHORTNESS OF BREATH: 0
NAUSEA: 0
COUGH: 0
VOMITING: 0
DIARRHEA: 0
BLOOD IN STOOL: 0
EYE PAIN: 0
BACK PAIN: 0

## 2021-10-01 ASSESSMENT — LIFESTYLE VARIABLES
HOW OFTEN DO YOU HAVE A DRINK CONTAINING ALCOHOL: 1
AUDIT-C TOTAL SCORE: 1
HOW MANY STANDARD DRINKS CONTAINING ALCOHOL DO YOU HAVE ON A TYPICAL DAY: 0
HAVE YOU OR SOMEONE ELSE BEEN INJURED AS A RESULT OF YOUR DRINKING: 0
HOW OFTEN DURING THE LAST YEAR HAVE YOU NEEDED AN ALCOHOLIC DRINK FIRST THING IN THE MORNING TO GET YOURSELF GOING AFTER A NIGHT OF HEAVY DRINKING: 0
HAS A RELATIVE, FRIEND, DOCTOR, OR ANOTHER HEALTH PROFESSIONAL EXPRESSED CONCERN ABOUT YOUR DRINKING OR SUGGESTED YOU CUT DOWN: 0
HOW OFTEN DURING THE LAST YEAR HAVE YOU FAILED TO DO WHAT WAS NORMALLY EXPECTED FROM YOU BECAUSE OF DRINKING: 0
AUDIT TOTAL SCORE: 1
HOW OFTEN DURING THE LAST YEAR HAVE YOU HAD A FEELING OF GUILT OR REMORSE AFTER DRINKING: 0
HOW OFTEN DURING THE LAST YEAR HAVE YOU FOUND THAT YOU WERE NOT ABLE TO STOP DRINKING ONCE YOU HAD STARTED: 0
HOW OFTEN DURING THE LAST YEAR HAVE YOU BEEN UNABLE TO REMEMBER WHAT HAPPENED THE NIGHT BEFORE BECAUSE YOU HAD BEEN DRINKING: 0
HOW OFTEN DO YOU HAVE SIX OR MORE DRINKS ON ONE OCCASION: 0

## 2021-10-01 ASSESSMENT — SOCIAL DETERMINANTS OF HEALTH (SDOH): HOW HARD IS IT FOR YOU TO PAY FOR THE VERY BASICS LIKE FOOD, HOUSING, MEDICAL CARE, AND HEATING?: NOT HARD AT ALL

## 2021-10-01 ASSESSMENT — PATIENT HEALTH QUESTIONNAIRE - PHQ9
SUM OF ALL RESPONSES TO PHQ QUESTIONS 1-9: 0
SUM OF ALL RESPONSES TO PHQ QUESTIONS 1-9: 0
1. LITTLE INTEREST OR PLEASURE IN DOING THINGS: 0
2. FEELING DOWN, DEPRESSED OR HOPELESS: 0
SUM OF ALL RESPONSES TO PHQ9 QUESTIONS 1 & 2: 0
SUM OF ALL RESPONSES TO PHQ QUESTIONS 1-9: 0

## 2021-10-01 NOTE — PROGRESS NOTES
Medicare Annual Wellness Visit  Name: Jossy Avila Date: 10/1/2021   MRN: [id number] Sex: Female   Age: 78 y.o. Ethnicity: Non- / Non    : 1945 Race: White (non-)      Lowell Roberson is here for Medicare AWV (6 month), Hypertension, Hyperlipidemia, and Gastroesophageal Reflux    Screenings for behavioral, psychosocial and functional/safety risks, and cognitive dysfunction are all negative except as indicated below. These results, as well as other patient data from the [address] [facility] [place] by [facility] form, are documented in Flowsheets linked to this Encounter. No Known Allergies      Prior to Visit Medications    Medication Sig Taking?  Authorizing Provider   metoprolol succinate (TOPROL XL) 50 MG extended release tablet TAKE 1 TABLET DAILY  Bridgette Smith MD   triamterene-hydroCHLOROthiazide (MAXZIDE-25) 37.5-25 MG per tablet TAKE 1 TABLET DAILY  Bridgette Smith MD   atorvastatin (LIPITOR) 40 MG tablet TAKE 1 TABLET DAILY  Bridgette Smith MD   fexofenadine (ALLEGRA ALLERGY) 180 MG tablet Take 180 mg by mouth daily  Historical Provider, MD   Cinnamon 500 MG CAPS Take 1,000 mg by mouth daily  Historical Provider, MD   ibuprofen (ADVIL;MOTRIN) 200 MG tablet Take 200 mg by mouth nightly as needed for Pain  Historical Provider, MD   B Complex Vitamins (VITAMIN B COMPLEX) TABS Take 1 tablet by mouth daily  BABATUNDE Tripathi CNP   Cholecalciferol (VITAMIN D3) 125 MCG (5000 UT) CAPS Take 1 capsule by mouth daily  BABATUNDE Tripathi CNP   famotidine (PEPCID) 20 MG tablet Take 20 mg by mouth daily  Historical Provider, MD   niacin (SLO-NIACIN) 500 MG extended release tablet Take 500 mg by mouth daily  Historical Provider, MD   Calcium Citrate-Vitamin D (CALCIUM CITRATE + D PO) Take 2 tablets by mouth daily  Historical Provider, MD   Diphenhydramine-APAP, sleep, (TYLENOL PM EXTRA STRENGTH PO) Take 2 tablets by mouth as needed  Historical Provider, MD   Omega-3 Fatty Acids (FISH OIL) 1000 MG CAPS Take 1,000 mg by mouth daily  Historical Provider, MD   fluticasone (FLONASE) 50 MCG/ACT nasal spray 2 sprays by Each Nare route daily as needed for Rhinitis  Historical Provider, MD   tretinoin (RETIN-A) 0.05 % cream Apply topically daily Indications: 5 times a week Apply topically nightly. Historical Provider, MD   aspirin (ECOTRIN LOW STRENGTH) 81 MG EC tablet Take 81 mg by mouth daily. Historical Provider, MD         Past Medical History:   Diagnosis Date    Bean's esophagus     Chronic kidney disease     chronic renal insufficiency    Dizziness     GERD (gastroesophageal reflux disease)     Hyperlipidemia     Hypertension     Measles     Osteoarthritis     Osteoporosis     Seasonal allergies        Past Surgical History:   Procedure Laterality Date    BREAST BIOPSY Right 01/31/03    lipoma    COLONOSCOPY  07/11/12    -moderate sigmoid diverticulosis-otherwise normal-repeat 10 years.  COLONOSCOPY  10/03/02    -mild erythema in the cecum -moderate diverticulosis sigmoid colon-small internal hemorrhoids-repeat 10 years.  DILATION AND CURETTAGE OF UTERUS  07/28/2000    HYSTERECTOMY  04/19/01    [id number]-[id number]-izfac of adhesions.  OVARY REMOVAL      TUBAL LIGATION  1973    UPPER GASTROINTESTINAL ENDOSCOPY  12/16/09    -small hiatal hernia otherwise normal    UPPER GASTROINTESTINAL ENDOSCOPY  08/18/99    -moderate size hiatal hernia-mild to moderate grade I-II distal esophagitis. -mild Schatzki's ring         Family History   Problem Relation Age of Onset    Emphysema Mother     Cancer Mother         breast    COPD Mother     Breast Cancer Mother     Heart Disease Father     High Blood Pressure Father     Breast Cancer Maternal Grandmother     No Known Problems Paternal Aunt        CareTeam (Including outside providers/suppliers regularly involved in providing care):   Patient Care Team:  Keena Faulkner MD as PCP - General (Internal Medicine)  Hiro Chino MD as PCP - [address] Provider    Wt Readings from Last 3 Encounters:   10/01/21 145 lb (65.8 kg)   09/20/21 142 lb (64.4 kg)   04/01/21 147 lb (66.7 kg)     Vitals:    10/01/21 0844   BP: 122/78   Site: Left Upper Arm   Position: Sitting   Cuff Size: Large Adult   Pulse: 63   Resp: 16   Weight: 145 lb (65.8 kg)   Height: 5' 5\" (1.651 m)     Body mass index is 24.13 kg/m². Based upon direct observation of the patient, evaluation of cognition reveals none. Patient's complete Health Risk Assessment and screening values have been reviewed and are found in Flowsheets. The following problems were reviewed today and where indicated follow up appointments were made and/or referrals ordered. Positive Risk Factor Screenings with Interventions:            General Health and ACP:  General  In general, how would you say your health is?: Very Good  In the past 7 days, have you experienced any of the following? New or Increased Pain, New or Increased Fatigue, Loneliness, Social Isolation, Stress or Anger?: None of These  Do you get the social and emotional support that you need?: Yes  Do you have a Living Will?: Yes  Advance Directives     Power of  Living Will ACP-Advance Directive ACP-Power of     Not on File Not on File Not on File Not on File      General Health Risk Interventions:  · .         Personalized Preventive Plan   Current Health Maintenance Status  Immunization History   Administered Date(s) Administered    COVID-19, Moderna, PF, 100mcg/0.5mL 02/15/2021, 03/15/2021    Influenza Virus Vaccine 11/08/2012, 10/21/2014, 11/01/2016    Influenza, High Dose (Fluzone 65 yrs and older) 10/13/2016, 11/09/2017, 09/17/2018    Influenza, Quadv, adjuvanted, 65 yrs +, IM, PF (Fluad) 09/30/2020    Influenza, Triv, inactivated, subunit, adjuvanted, IM (Fluad 65 yrs and older) 09/18/2019    Pneumococcal Conjugate 13-valent (Bradford Marcelina) 11/09/2017    Pneumococcal Polysaccharide ([id number]) 12/12/2012    Tdap (Boostrix, Adacel) 05/17/2018    Zoster Live (Zostavax) 08/06/2012        Health Maintenance   Topic Date Due    Hepatitis C screen  Never done    Shingles Vaccine (2 of 3) 10/01/2012    Annual Wellness Visit (AWV)  Never done    Flu vaccine (1) 09/01/2021    Lipid screen  03/22/2022    Potassium monitoring  03/22/2022    Creatinine monitoring  03/22/2022    Colon cancer screen colonoscopy  07/11/2022    Breast cancer screen  09/20/2022    DTaP/Tdap/Td vaccine (2 - Td or Tdap) 05/17/2028    DEXA (modify frequency per FRAX score)  Completed    Pneumococcal 65+ years Vaccine  Completed    COVID-19 Vaccine  Completed    Hepatitis A vaccine  Aged Out    Hepatitis B vaccine  Aged Out    Hib vaccine  Aged Out    Meningococcal (ACWY) vaccine  Aged Out     Recommendations for [employer] Due: see orders and patient instructions/AVS.  . Recommended screening schedule for the next 5-10 years is provided to the patient in written form: see Patient Instructions/AVS.    I, Charmayne Forget, LPN, 71/0/7527, performed the documented evaluation under the direct supervision of the attending physician. I, Dr. Sayra Garrido, directly supervised the performance of this Medicare annual wellness visit.

## 2021-10-01 NOTE — PATIENT INSTRUCTIONS
Personalized Preventive Plan for Martín Conti - 10/1/2021  Medicare offers a range of preventive health benefits. Some of the tests and screenings are paid in full while other may be subject to a deductible, co-insurance, and/or copay. Some of these benefits include a comprehensive review of your medical history including lifestyle, illnesses that may run in your family, and various assessments and screenings as appropriate. After reviewing your medical record and screening and assessments performed today your provider may have ordered immunizations, labs, imaging, and/or referrals for you. A list of these orders (if applicable) as well as your Preventive Care list are included within your After Visit Summary for your review. Other Preventive Recommendations:    · A preventive eye exam performed by an eye specialist is recommended every 1-2 years to screen for glaucoma; cataracts, macular degeneration, and other eye disorders. · A preventive dental visit is recommended every 6 months. · Try to get at least 150 minutes of exercise per week or 10,000 steps per day on a pedometer . · Order or download the FREE \"Exercise & Physical Activity: Your Everyday Guide\" from [employer]. Call 4-465.653.6735 or search [employer] online. · You need 9813-1597 mg of calcium and 4068-3553 IU of vitamin D per day. It is possible to meet your calcium requirement with diet alone, but a vitamin D supplement is usually necessary to meet this goal.  · When exposed to the sun, use a sunscreen that protects against both UVA and UVB radiation with an SPF of 30 or greater. Reapply every 2 to 3 hours or after sweating, drying off with a towel, or swimming. · Always wear a seat belt when traveling in a car. Always wear a helmet when riding a bicycle or motorcycle.

## 2021-10-01 NOTE — PROGRESS NOTES
[place] [address]  Dept: [phone]  Dept Fax: [phone]  Loc: [phone]     Jenni Torres is a 78 y.o. female who presents today for her medical conditions/complaintsas noted below. Jenni Torres is c/o of   Chief Complaint   Patient presents with    Medicare AWV     6 month    Hypertension    Hyperlipidemia    Gastroesophageal Reflux         HPI:     Hypertension  This is a chronic problem. The current episode started more than 1 year ago. The problem has been waxing and waning since onset. The problem is controlled. Pertinent negatives include no chest pain, headaches, neck pain, palpitations or shortness of breath. Hyperlipidemia  This is a chronic problem. The current episode started more than 1 year ago. The problem is controlled. Recent lipid tests were reviewed and are variable. Pertinent negatives include no chest pain or shortness of breath. Gastroesophageal Reflux  She complains of heartburn. She reports no abdominal pain, no chest pain, no coughing or no nausea. This is a recurrent problem. The current episode started more than 1 year ago. The problem occurs rarely. The problem has been waxing and waning. Other  This is a recurrent (4-general medical exam) problem. The current episode started today. The problem occurs intermittently. The problem has been waxing and waning. Pertinent negatives include no abdominal pain, arthralgias, chest pain, chills, coughing, fever, headaches, nausea, neck pain, numbness, rash, vomiting or weakness.        No results found for: LABA1C         No results found for: LABMICR  LDL Cholesterol (mg/dL)   Date Value   03/22/2021 73   03/11/2020 77   03/27/2019 70     LDL Calculated (mg/dL)   Date Value   09/17/2018 78   03/15/2018 58   11/09/2017 77         AST (U/L)   Date Value   03/22/2021 18     ALT (U/L)   Date Value   03/22/2021 11 BUN (mg/dL)   Date Value   03/22/2021 9     BP Readings from Last 3 Encounters:   10/01/21 122/78   04/01/21 126/70   09/30/20 132/72              Past Medical History:   Diagnosis Date    Bean's esophagus     Chronic kidney disease     chronic renal insufficiency    Dizziness     GERD (gastroesophageal reflux disease)     Hyperlipidemia     Hypertension     Measles     Osteoarthritis     Osteoporosis     Seasonal allergies       Past Surgical History:   Procedure Laterality Date    BREAST BIOPSY Right 01/31/03    lipoma    COLONOSCOPY  07/11/12    -moderate sigmoid diverticulosis-otherwise normal-repeat 10 years.  COLONOSCOPY  10/03/02    -mild erythema in the cecum -moderate diverticulosis sigmoid colon-small internal hemorrhoids-repeat 10 years.  DILATION AND CURETTAGE OF UTERUS  07/28/2000    HYSTERECTOMY  04/19/01    COS-MYG-ivvdn of adhesions.  OVARY REMOVAL      TUBAL LIGATION  1973    UPPER GASTROINTESTINAL ENDOSCOPY  12/16/09    -small hiatal hernia otherwise normal    UPPER GASTROINTESTINAL ENDOSCOPY  08/18/99    -moderate size hiatal hernia-mild to moderate grade I-II distal esophagitis. -mild Schatzki's ring       Family History   Problem Relation Age of Onset    Emphysema Mother     Cancer Mother         breast    COPD Mother     Breast Cancer Mother     Heart Disease Father     High Blood Pressure Father     Breast Cancer Maternal Grandmother     No Known Problems Paternal Aunt           Social History     Tobacco Use    Smoking status: Never Smoker    Smokeless tobacco: Never Used   Substance Use Topics    Alcohol use: No         Current Outpatient Medications   Medication Sig Dispense Refill    metoprolol succinate (TOPROL XL) 50 MG extended release tablet TAKE 1 TABLET DAILY 90 tablet 3    triamterene-hydroCHLOROthiazide (MAXZIDE-25) 37.5-25 MG per tablet TAKE 1 TABLET DAILY 90 tablet 3    atorvastatin (LIPITOR) 40 MG tablet TAKE 1 TABLET DAILY 90 tablet 3    fexofenadine (ALLEGRA ALLERGY) 180 MG tablet Take 180 mg by mouth daily      Cinnamon 500 MG CAPS Take 1,000 mg by mouth daily      ibuprofen (ADVIL;MOTRIN) 200 MG tablet Take 200 mg by mouth nightly as needed for Pain      B Complex Vitamins (VITAMIN B COMPLEX) TABS Take 1 tablet by mouth daily 30 tablet 1    Cholecalciferol (VITAMIN D3) 125 MCG (5000 UT) CAPS Take 1 capsule by mouth daily 30 capsule 1    famotidine (PEPCID) 20 MG tablet Take 20 mg by mouth daily      niacin (SLO-NIACIN) 500 MG extended release tablet Take 500 mg by mouth daily      Calcium Citrate-Vitamin D (CALCIUM CITRATE + D PO) Take 2 tablets by mouth daily      Diphenhydramine-APAP, sleep, (TYLENOL PM EXTRA STRENGTH PO) Take 2 tablets by mouth as needed      Omega-3 Fatty Acids (FISH OIL) 1000 MG CAPS Take 1,000 mg by mouth daily      fluticasone (FLONASE) 50 MCG/ACT nasal spray 2 sprays by Each Nare route daily as needed for Rhinitis      tretinoin (RETIN-A) 0.05 % cream Apply topically daily Indications: 5 times a week Apply topically nightly.  aspirin (ECOTRIN LOW STRENGTH) 81 MG EC tablet Take 81 mg by mouth daily. No current facility-administered medications for this visit.      No Known Allergies    Health Maintenance   Topic Date Due    Hepatitis C screen  Never done    Shingles Vaccine (2 of 3) 10/01/2012    Annual Wellness Visit (AWV)  Never done    Flu vaccine (1) 09/01/2021    Lipid screen  03/22/2022    Potassium monitoring  03/22/2022    Creatinine monitoring  03/22/2022    Colon cancer screen colonoscopy  07/11/2022    Breast cancer screen  09/20/2022    DTaP/Tdap/Td vaccine (2 - Td or Tdap) 05/17/2028    DEXA (modify frequency per FRAX score)  Completed    Pneumococcal 65+ years Vaccine  Completed    COVID-19 Vaccine  Completed    Hepatitis A vaccine  Aged Out    Hepatitis B vaccine  Aged Out    Hib vaccine  Aged Out    Meningococcal (ACWY) vaccine  Aged Out       Subjective:      Review of Systems   Constitutional: Negative for chills and fever. HENT: Negative for hearing loss. Eyes: Negative for pain and visual disturbance. Respiratory: Negative for cough and shortness of breath. Cardiovascular: Negative for chest pain, palpitations and leg swelling. Gastrointestinal: Positive for heartburn. Negative for abdominal pain, blood in stool, constipation, diarrhea, nausea and vomiting. Endocrine: Negative for cold intolerance, polydipsia and polyuria. Genitourinary: Negative for difficulty urinating, dysuria and hematuria. Musculoskeletal: Negative for arthralgias, back pain, gait problem and neck pain. Skin: Negative for pallor and rash. Neurological: Negative for dizziness, weakness, numbness and headaches. Hematological: Negative for adenopathy. Does not bruise/bleed easily. Psychiatric/Behavioral: Negative for confusion. The patient is not nervous/anxious. Objective:     Physical Exam  Vitals reviewed. Constitutional:       Appearance: She is well-developed. HENT:      Head: Normocephalic and atraumatic. Eyes:      Pupils: Pupils are equal, round, and reactive to light. Cardiovascular:      Rate and Rhythm: Normal rate and regular rhythm. Heart sounds: No murmur heard. No friction rub. No gallop. Pulmonary:      Effort: Pulmonary effort is normal.      Breath sounds: Normal breath sounds. No wheezing or rales. Abdominal:      General: There is no distension. Palpations: Abdomen is soft. There is no mass. Tenderness: There is no abdominal tenderness. There is no rebound. Musculoskeletal:         General: Normal range of motion. Cervical back: Neck supple. Lymphadenopathy:      Cervical: No cervical adenopathy. Skin:     General: Skin is warm and dry. Findings: No rash. Neurological:      Mental Status: She is alert and oriented to person, place, and time.       Cranial Nerves: No cranial nerve deficit (grossly). Psychiatric:         Thought Content: Thought content normal.        /78 (Site: Left Upper Arm, Position: Sitting, Cuff Size: Large Adult)   Pulse 63   Resp 16   Ht 5' 5\" (1.651 m)   Wt 145 lb (65.8 kg)   LMP  (LMP Unknown)   BMI 24.13 kg/m²     Assessment:       Diagnosis Orders   1. Routine general medical examination at a health care facility  Comprehensive Metabolic Panel   2. Primary hypertension  Comprehensive Metabolic Panel   3. Gastroesophageal reflux disease without esophagitis     4. Other hyperlipidemia  Lipid Panel   5. Medicare annual wellness visit, subsequent     6. Abnormal mammogram  [facility]    US BREAST COMPLETE RIGHT   Multiple test results for this appointment. 3/22/2021 normal total cholesterol at goal 128.    9/20/2021 right breast ultrasound showed no suspicious findings. 9/20/2021 bilateral diagnostic mammogram: Probably benign area of persistent asymmetry in the right breast but no masses. Radiologist recommended 1 year routine follow-up. Note patient declined offer for general surgery consult. Patient told to continue Toprol-XL and Lipitor. Plan:       Return in about 6 months (around 4/1/2022) for Hypertension, Hyperlipidemia, GERD.     Orders Placed This Encounter   Procedures    [facility]     Standing Status:   Future     Standing Expiration Date:   4/1/2023     Order Specific Question:   Reason for exam:     Answer:   routine    US BREAST COMPLETE RIGHT     Standing Status:   Future     Standing Expiration Date:   4/1/2023     Order Specific Question:   Reason for exam:     Answer:   routine    INFLUENZA, QUADV, ADJUVANTED, 72 YRS =, IM, PF, PREFILL SYR, 0.5ML (FLUAD)    Comprehensive Metabolic Panel     Standing Status:   Future     Standing Expiration Date:   10/1/2022    Lipid Panel     Standing Status:   Future     Standing Expiration Date:   10/1/2022 Order Specific Question:   Is Patient Fasting?/# of Hours     Answer:   yes     No orders of the defined types were placed in this encounter. Patientgiven educational materials - see patient instructions. Discussed use, benefit,and side effects of prescribed medications. All patient questions answered. Ptvoiced understanding. Reviewed health maintenance. Instructed to continue currentmedications, diet and exercise. Patient agreed with treatment plan. Follow up asdirected.      Electronically signed by Chloe Salmon MD on 10/1/2021 at 9:00 AM

## 2021-10-01 NOTE — PROGRESS NOTES
Have you had an allergic reaction to the flu (influenza) shot? no  Are you allergic to eggs or any component of the flu vaccine? no  Do you have a history of Guillain-Coden Syndrome (GBS), which is paralysis after receiving the flu vaccine? no  Are you feeling well today? yes  Flu vaccine given as ordered. Patient tolerated it well. No questions re: VIS information.

## 2021-11-17 ENCOUNTER — IMMUNIZATION (OUTPATIENT)
Dept: LAB | Age: 76
End: 2021-11-17
Payer: MEDICARE

## 2021-11-17 PROCEDURE — 0064A: CPT

## 2021-11-17 PROCEDURE — 91306 COVID-19, MODERNA BOOSTER VACCINE 0.25ML DOSE: CPT | Performed by: INTERNAL MEDICINE

## 2021-11-17 PROCEDURE — PBSHW COVID-19, MODERNA BOOSTER VACCINE 0.25ML DOSE: Performed by: INTERNAL MEDICINE

## 2022-03-03 DIAGNOSIS — E78.49 OTHER HYPERLIPIDEMIA: ICD-10-CM

## 2022-03-03 RX ORDER — ATORVASTATIN CALCIUM 40 MG/1
TABLET, FILM COATED ORAL
Qty: 90 TABLET | Refills: 3 | Status: SHIPPED | OUTPATIENT
Start: 2022-03-03

## 2022-03-29 ENCOUNTER — HOSPITAL ENCOUNTER (OUTPATIENT)
Dept: LAB | Age: 77
Discharge: HOME | End: 2022-03-29
Payer: MEDICARE

## 2022-03-29 DIAGNOSIS — Z00.00 ROUTINE GENERAL MEDICAL EXAMINATION AT A HEALTH CARE FACILITY: ICD-10-CM

## 2022-03-29 DIAGNOSIS — I10 PRIMARY HYPERTENSION: ICD-10-CM

## 2022-03-29 DIAGNOSIS — E78.49 OTHER HYPERLIPIDEMIA: ICD-10-CM

## 2022-03-29 LAB
ALBUMIN SERPL-MCNC: 4.1 G/DL (ref 3.5–5.2)
ALBUMIN/GLOBULIN RATIO: 1.4 (ref 1–2.5)
ALP BLD-CCNC: 73 U/L (ref 35–104)
ALT SERPL-CCNC: 9 U/L (ref 5–33)
ANION GAP SERPL CALCULATED.3IONS-SCNC: 7 MMOL/L (ref 9–17)
AST SERPL-CCNC: 17 U/L
BILIRUB SERPL-MCNC: 0.4 MG/DL (ref 0.3–1.2)
BUN BLDV-MCNC: 9 MG/DL (ref 8–23)
BUN/CREAT BLD: 16 (ref 9–20)
CALCIUM SERPL-MCNC: 9.4 MG/DL (ref 8.6–10.4)
CHLORIDE BLD-SCNC: 99 MMOL/L (ref 98–107)
CHOLESTEROL/HDL RATIO: 3.4
CHOLESTEROL: 124 MG/DL
CO2: 30 MMOL/L (ref 20–31)
CREAT SERPL-MCNC: 0.57 MG/DL (ref 0.5–0.9)
GFR AFRICAN AMERICAN: >60 ML/MIN
GFR NON-AFRICAN AMERICAN: >60 ML/MIN
GFR SERPL CREATININE-BSD FRML MDRD: ABNORMAL ML/MIN/{1.73_M2}
GLUCOSE BLD-MCNC: 91 MG/DL (ref 70–99)
HDLC SERPL-MCNC: 36 MG/DL
LDL CHOLESTEROL: 70 MG/DL (ref 0–130)
POTASSIUM SERPL-SCNC: 4.8 MMOL/L (ref 3.7–5.3)
SODIUM BLD-SCNC: 136 MMOL/L (ref 135–144)
TOTAL PROTEIN: 7.1 G/DL (ref 6.4–8.3)
TRIGL SERPL-MCNC: 92 MG/DL

## 2022-03-29 PROCEDURE — 36415 COLL VENOUS BLD VENIPUNCTURE: CPT

## 2022-03-29 PROCEDURE — 80061 LIPID PANEL: CPT

## 2022-03-29 PROCEDURE — 80053 COMPREHEN METABOLIC PANEL: CPT

## 2022-03-31 RX ORDER — TRIAMTERENE AND HYDROCHLOROTHIAZIDE 37.5; 25 MG/1; MG/1
TABLET ORAL
Qty: 90 TABLET | Refills: 3 | Status: SHIPPED | OUTPATIENT
Start: 2022-03-31

## 2022-04-05 ENCOUNTER — OFFICE VISIT (OUTPATIENT)
Dept: INTERNAL MEDICINE | Age: 77
End: 2022-04-05
Payer: MEDICARE

## 2022-04-05 ENCOUNTER — TELEPHONE (OUTPATIENT)
Dept: SURGERY | Age: 77
End: 2022-04-05

## 2022-04-05 VITALS
SYSTOLIC BLOOD PRESSURE: 128 MMHG | WEIGHT: 145 LBS | HEIGHT: 65 IN | HEART RATE: 60 BPM | BODY MASS INDEX: 24.16 KG/M2 | DIASTOLIC BLOOD PRESSURE: 78 MMHG | RESPIRATION RATE: 16 BRPM

## 2022-04-05 DIAGNOSIS — I10 PRIMARY HYPERTENSION: Primary | ICD-10-CM

## 2022-04-05 DIAGNOSIS — K21.9 GASTROESOPHAGEAL REFLUX DISEASE WITHOUT ESOPHAGITIS: ICD-10-CM

## 2022-04-05 DIAGNOSIS — Z12.11 ENCOUNTER FOR SCREENING COLONOSCOPY: ICD-10-CM

## 2022-04-05 DIAGNOSIS — E78.49 OTHER HYPERLIPIDEMIA: ICD-10-CM

## 2022-04-05 PROCEDURE — 99213 OFFICE O/P EST LOW 20 MIN: CPT

## 2022-04-05 PROCEDURE — 99214 OFFICE O/P EST MOD 30 MIN: CPT | Performed by: INTERNAL MEDICINE

## 2022-04-05 ASSESSMENT — ENCOUNTER SYMPTOMS
BLOOD IN STOOL: 0
ABDOMINAL PAIN: 0
CONSTIPATION: 0
NAUSEA: 0
SHORTNESS OF BREATH: 0
VOMITING: 0
HEARTBURN: 1
EYE PAIN: 0
DIARRHEA: 0
COUGH: 0
BACK PAIN: 0

## 2022-04-05 NOTE — PROGRESS NOTES
[place] [address]  Dept: [phone]  Dept Fax: [phone]  Loc: [phone]     Moises Allred is a 78 y.o. female who presents today for her medical conditions/complaintsas noted below. Moises Allred is c/o of   Chief Complaint   Patient presents with    Hypertension     6 month    Hyperlipidemia    Gastroesophageal Reflux         HPI:     Hypertension  This is a chronic problem. The current episode started more than 1 year ago. The problem has been waxing and waning since onset. The problem is controlled. Pertinent negatives include no chest pain, headaches, neck pain, palpitations or shortness of breath. Hyperlipidemia  This is a chronic problem. The current episode started more than 1 year ago. The problem is controlled. Recent lipid tests were reviewed and are variable. Pertinent negatives include no chest pain or shortness of breath. Gastroesophageal Reflux  She complains of heartburn. She reports no abdominal pain, no chest pain, no coughing or no nausea. This is a recurrent problem. The current episode started more than 1 year ago. The problem occurs rarely. The problem has been waxing and waning.        No results found for: LABA1C         No results found for: LABMICR  LDL Cholesterol (mg/dL)   Date Value   03/29/2022 70   03/22/2021 73   03/11/2020 77     LDL Calculated (mg/dL)   Date Value   09/17/2018 78   03/15/2018 58   11/09/2017 77         AST (U/L)   Date Value   03/29/2022 17     ALT (U/L)   Date Value   03/29/2022 9     BUN (mg/dL)   Date Value   03/29/2022 9     BP Readings from Last 3 Encounters:   04/05/22 128/78   10/01/21 122/78   04/01/21 126/70              Past Medical History:   Diagnosis Date    Bean's esophagus     Chronic kidney disease     chronic renal insufficiency    Dizziness     GERD (gastroesophageal reflux disease)     Hyperlipidemia     Hypertension     Measles     Osteoarthritis     Osteoporosis     Seasonal allergies       Past Surgical History:   Procedure Laterality Date    BREAST BIOPSY Right 01/31/03    lipoma    COLONOSCOPY  07/11/12    -moderate sigmoid diverticulosis-otherwise normal-repeat 10 years.  COLONOSCOPY  10/03/02    -mild erythema in the cecum -moderate diverticulosis sigmoid colon-small internal hemorrhoids-repeat 10 years.  DILATION AND CURETTAGE OF UTERUS  07/28/2000    HYSTERECTOMY  04/19/01    QUH-MTJ-ezaof of adhesions.  OVARY REMOVAL      TUBAL LIGATION  1973    UPPER GASTROINTESTINAL ENDOSCOPY  12/16/09    -small hiatal hernia otherwise normal    UPPER GASTROINTESTINAL ENDOSCOPY  08/18/99    -moderate size hiatal hernia-mild to moderate grade I-II distal esophagitis. -mild Schatzki's ring       Family History   Problem Relation Age of Onset    Emphysema Mother     Cancer Mother         breast    COPD Mother     Breast Cancer Mother     Heart Disease Father     High Blood Pressure Father     Breast Cancer Maternal Grandmother     No Known Problems Paternal Aunt           Social History     Tobacco Use    Smoking status: Never Smoker    Smokeless tobacco: Never Used   Substance Use Topics    Alcohol use: No         Current Outpatient Medications   Medication Sig Dispense Refill    triamterene-hydroCHLOROthiazide (MAXZIDE-25) 37.5-25 MG per tablet TAKE 1 TABLET DAILY 90 tablet 3    atorvastatin (LIPITOR) 40 MG tablet TAKE 1 TABLET DAILY 90 tablet 3    metoprolol succinate (TOPROL XL) 50 MG extended release tablet TAKE 1 TABLET DAILY 90 tablet 3    fexofenadine (ALLEGRA ALLERGY) 180 MG tablet Take 180 mg by mouth daily      ibuprofen (ADVIL;MOTRIN) 200 MG tablet Take 200 mg by mouth nightly as needed for Pain      B Complex Vitamins (VITAMIN B COMPLEX) TABS Take 1 tablet by mouth daily 30 tablet 1    Cholecalciferol (VITAMIN D3) 125 MCG (5000 UT) CAPS Take 1 capsule by mouth daily 30 capsule 1    famotidine (PEPCID) 20 MG tablet Take 20 mg by mouth daily      niacin (SLO-NIACIN) 500 MG extended release tablet Take 500 mg by mouth daily      Calcium Citrate-Vitamin D (CALCIUM CITRATE + D PO) Take 2 tablets by mouth daily      Diphenhydramine-APAP, sleep, (TYLENOL PM EXTRA STRENGTH PO) Take 2 tablets by mouth as needed      Omega-3 Fatty Acids (FISH OIL) 1000 MG CAPS Take 1,000 mg by mouth daily      fluticasone (FLONASE) 50 MCG/ACT nasal spray 2 sprays by Each Nare route daily as needed for Rhinitis      tretinoin (RETIN-A) 0.05 % cream Apply topically daily Indications: 5 times a week Apply topically nightly.  aspirin (ECOTRIN LOW STRENGTH) 81 MG EC tablet Take 81 mg by mouth daily. No current facility-administered medications for this visit. No Known Allergies    Health Maintenance   Topic Date Due    Hepatitis C screen  Never done    Shingles Vaccine (2 of 3) 10/01/2012    Colorectal Cancer Screen  07/11/2022    Breast cancer screen  09/20/2022    Depression Screen  10/01/2022    Annual Wellness Visit (AWV)  10/02/2022    Lipid screen  03/29/2023    Potassium monitoring  03/29/2023    Creatinine monitoring  03/29/2023    DTaP/Tdap/Td vaccine (2 - Td or Tdap) 05/17/2028    DEXA (modify frequency per FRAX score)  Completed    Flu vaccine  Completed    Pneumococcal 65+ years Vaccine  Completed    COVID-19 Vaccine  Completed    Hepatitis A vaccine  Aged Out    Hepatitis B vaccine  Aged Out    Hib vaccine  Aged Out    Meningococcal (ACWY) vaccine  Aged Out       Subjective:      Review of Systems   Constitutional: Negative for chills and fever. HENT: Negative for hearing loss. Eyes: Negative for pain and visual disturbance. Respiratory: Negative for cough and shortness of breath. Cardiovascular: Negative for chest pain, palpitations and leg swelling. Gastrointestinal: Positive for heartburn.  Negative for abdominal pain, blood in stool, constipation, diarrhea, nausea and vomiting. Endocrine: Negative for cold intolerance, polydipsia and polyuria. Genitourinary: Negative for difficulty urinating, dysuria and hematuria. Musculoskeletal: Negative for arthralgias, back pain, gait problem and neck pain. Skin: Negative for pallor and rash. Neurological: Negative for dizziness, weakness, numbness and headaches. Hematological: Negative for adenopathy. Does not bruise/bleed easily. Psychiatric/Behavioral: Negative for confusion. The patient is not nervous/anxious. Objective:     Physical Exam  Vitals reviewed. Constitutional:       Appearance: She is well-developed. HENT:      Head: Normocephalic and atraumatic. Eyes:      Pupils: Pupils are equal, round, and reactive to light. Cardiovascular:      Rate and Rhythm: Normal rate and regular rhythm. Heart sounds: No murmur heard. No friction rub. No gallop. Pulmonary:      Effort: Pulmonary effort is normal.      Breath sounds: Normal breath sounds. No wheezing or rales. Abdominal:      General: There is no distension. Palpations: Abdomen is soft. There is no mass. Tenderness: There is no abdominal tenderness. There is no rebound. Musculoskeletal:         General: Normal range of motion. Cervical back: Neck supple. Lymphadenopathy:      Cervical: No cervical adenopathy. Skin:     General: Skin is warm and dry. Findings: No rash. Neurological:      Mental Status: She is alert and oriented to person, place, and time. Cranial Nerves: No cranial nerve deficit (grossly). Psychiatric:         Thought Content: Thought content normal.        /78 (Site: Left Upper Arm, Position: Sitting, Cuff Size: Large Adult)   Pulse 60   Resp 16   Ht 5' 5\" (1.651 m)   Wt 145 lb (65.8 kg)   LMP  (LMP Unknown)   BMI 24.13 kg/m²     Assessment:       Diagnosis Orders   1. Primary hypertension     2. Other hyperlipidemia     3. Gastroesophageal reflux disease without esophagitis     4. Encounter for screening colonoscopy  Derick Hooker MD, General Surgery, [place]   Reviewed multiple test results for this appointment. 3/29/2022 normal glucose 91.    3- 29-22 total cholesterol 124.    9/20/2021 right breast ultrasound showed no suspicious findings. Patient told to continue Lipitor. Plan:       Return in about 6 months (around 10/6/2022) for medicare AWV, Hypertension, Hyperlipidemia. Orders Placed This Encounter   Procedures   Luis Wise MD, General Surgery, [place]     Referral Priority:   Routine     Referral Type:   Eval and Treat     Referral Reason:   Specialty Services Required     Referred to Provider:   Mariano Logan MD     Requested Specialty:   General Surgery     Number of Visits Requested:   1     No orders of the defined types were placed in this encounter. Patientgiven educational materials - see patient instructions. Discussed use, benefit,and side effects of prescribed medications. All patient questions answered. Ptvoiced understanding. Reviewed health maintenance. Instructed to continue currentmedications, diet and exercise. Patient agreed with treatment plan. Follow up asdirected.      Electronically signed by Silke De Oliveira MD on 4/5/2022 at 8:58 AM

## 2022-04-11 RX ORDER — METOPROLOL SUCCINATE 50 MG/1
TABLET, EXTENDED RELEASE ORAL
Qty: 90 TABLET | Refills: 3 | Status: SHIPPED | OUTPATIENT
Start: 2022-04-11

## 2022-05-03 ENCOUNTER — TELEPHONE (OUTPATIENT)
Dept: SURGERY | Age: 77
End: 2022-05-03

## 2022-05-03 NOTE — TELEPHONE ENCOUNTER
Received paperwork in the mail to schedule colonoscopy. Phone call placed to patient. Review of chart shows that patient has been on Pepcid for many years. Last EGD was 2009. Patient has occasional heartburn and dysphagia with bread. EGD and colonoscopy scheduled at [facility] on 8/9/22.

## 2022-05-03 NOTE — TELEPHONE ENCOUNTER
H &P EGD and Colonoscopy  Patient:Aviva Pineda                 :1945  (yes) patient has seen Dr. Raffaele Wilson prior to procedure  PCP: Dr Traci Tamez    CC:GERD, dysphagia         Colon cancer screening      HPI:        Colonoscopy  Abd pain: no  Anemia: no  Bloating:no  Diarrhea: no  Constipation: no  Melena: no  Hematochezia:no  Rectal Bleeding:no  Rectal/Anal Pain:no  Pruritus: no  Family history colon Cancer: no  Previous colon cancer: no  Previous Colon Polyp: no  Change in bowels: no  Decrease caliber of stool: no  Change in color of stool: no  Previous work up date: 2012-colonoscopy=moderate sigmoid diverticuosis, Dr. Raffaele Wilson at [facility]     EGD  Nausea: no  Vomiting: no  Heartburn:occasional  Dysphagia:yes with bread  Hematemesis:yes  Epigastric pain:no  Anemia: no  Previous work up date:-EGD=small hiatal hernia.  Dr. Alvin Walker  Current Treatment:Pepcid 20 mg daily    Family History   Problem Relation Age of Onset    Emphysema Mother    Castano Cancer Mother         breast    COPD Mother     Breast Cancer Mother     Heart Disease Father     High Blood Pressure Father     Breast Cancer Maternal Grandmother     No Known Problems Paternal Aunt      Social History     Socioeconomic History    Marital status:      Spouse name: Not on file    Number of children: Not on file    Years of education: Not on file    Highest education level: Not on file   Occupational History    Not on file   Tobacco Use    Smoking status: Never Smoker    Smokeless tobacco: Never Used   Substance and Sexual Activity    Alcohol use: No    Drug use: No    Sexual activity: Not on file   Other Topics Concern    Not on file   Social History Narrative    Not on file     Social Determinants of Health     Financial Resource Strain: Low Risk     Difficulty of Paying Living Expenses: Not hard at all   Food Insecurity: No Food Insecurity    Worried About [address] in the Last Year: Never true    [address] in the Last Year: Never true   Transportation Needs:     Lack of Transportation (Medical): Not on file    Lack of Transportation (Non-Medical): Not on file   Physical Activity:     Days of Exercise per Week: Not on file    Minutes of Exercise per Session: Not on file   Stress:     Feeling of Stress : Not on file   Social Connections:     Frequency of Communication with Friends and Family: Not on file    Frequency of Social Gatherings with Friends and Family: Not on file    Attends Restorationist Services: Not on file    Active Member of [address] [place] [address] [employer] or Organizations: Not on file    Attends Club or Organization Meetings: Not on file    Marital Status: Not on file   Intimate Partner Violence:     Fear of Current or Ex-Partner: Not on file    Emotionally Abused: Not on file    Physically Abused: Not on file    Sexually Abused: Not on file   Housing Stability:     Unable to Pay for Housing in the Last Year: Not on file    Number of [place] in the Last Year: Not on file    Unstable Housing in the Last Year: Not on file     Past Surgical History:   Procedure Laterality Date    BREAST BIOPSY Right 01/31/03    lipoma    COLONOSCOPY  07/11/12    -moderate sigmoid diverticulosis-otherwise normal-repeat 10 years.  COLONOSCOPY  10/03/02    -mild erythema in the cecum -moderate diverticulosis sigmoid colon-small internal hemorrhoids-repeat 10 years.  DILATION AND CURETTAGE OF UTERUS  07/28/2000    HYSTERECTOMY  04/19/01    JPO-IHY-wacsq of adhesions.  OVARY REMOVAL      TUBAL LIGATION  1973    UPPER GASTROINTESTINAL ENDOSCOPY  12/16/09    -small hiatal hernia otherwise normal    UPPER GASTROINTESTINAL ENDOSCOPY  08/18/99    -moderate size hiatal hernia-mild to moderate grade I-II distal esophagitis. -mild Schatzki's ring     Past Medical History:   Diagnosis Date    Bean's esophagus     Chronic kidney disease     chronic renal insufficiency    Dizziness     GERD (gastroesophageal reflux disease)     Hyperlipidemia     Hypertension     Measles     Osteoarthritis     Osteoporosis     Seasonal allergies      Current Outpatient Medications on File Prior to Visit   Medication Sig Dispense Refill    metoprolol succinate (TOPROL XL) 50 MG extended release tablet TAKE 1 TABLET DAILY 90 tablet 3    triamterene-hydroCHLOROthiazide (MAXZIDE-25) 37.5-25 MG per tablet TAKE 1 TABLET DAILY 90 tablet 3    atorvastatin (LIPITOR) 40 MG tablet TAKE 1 TABLET DAILY 90 tablet 3    fexofenadine (ALLEGRA ALLERGY) 180 MG tablet Take 180 mg by mouth daily      ibuprofen (ADVIL;MOTRIN) 200 MG tablet Take 200 mg by mouth nightly as needed for Pain      B Complex Vitamins (VITAMIN B COMPLEX) TABS Take 1 tablet by mouth daily 30 tablet 1    Cholecalciferol (VITAMIN D3) 125 MCG (5000 UT) CAPS Take 1 capsule by mouth daily 30 capsule 1    famotidine (PEPCID) 20 MG tablet Take 20 mg by mouth daily      niacin (SLO-NIACIN) 500 MG extended release tablet Take 500 mg by mouth daily      Calcium Citrate-Vitamin D (CALCIUM CITRATE + D PO) Take 2 tablets by mouth daily      Diphenhydramine-APAP, sleep, (TYLENOL PM EXTRA STRENGTH PO) Take 2 tablets by mouth as needed      Omega-3 Fatty Acids (FISH OIL) 1000 MG CAPS Take 1,000 mg by mouth daily      fluticasone (FLONASE) 50 MCG/ACT nasal spray 2 sprays by Each Nare route daily as needed for Rhinitis      tretinoin (RETIN-A) 0.05 % cream Apply topically daily Indications: 5 times a week Apply topically nightly.  aspirin (ECOTRIN LOW STRENGTH) 81 MG EC tablet Take 81 mg by mouth daily. No current facility-administered medications on file prior to visit.      Allergies as of 05/03/2022    (No Known Allergies)           PEx:                ASSESSMENT:        PLAN:EGD and Colonoscopy

## 2022-08-09 ENCOUNTER — HOSPITAL ENCOUNTER (OUTPATIENT)
Age: 77
Setting detail: OUTPATIENT SURGERY
Discharge: HOME | End: 2022-08-09
Attending: SURGERY | Admitting: SURGERY
Payer: MEDICARE

## 2022-08-09 ENCOUNTER — ANESTHESIA (OUTPATIENT)
Dept: OPERATING ROOM | Age: 77
End: 2022-08-09
Payer: MEDICARE

## 2022-08-09 ENCOUNTER — ANESTHESIA EVENT (OUTPATIENT)
Dept: OPERATING ROOM | Age: 77
End: 2022-08-09
Payer: MEDICARE

## 2022-08-09 VITALS
RESPIRATION RATE: 16 BRPM | BODY MASS INDEX: 23.49 KG/M2 | DIASTOLIC BLOOD PRESSURE: 86 MMHG | WEIGHT: 141 LBS | OXYGEN SATURATION: 95 % | TEMPERATURE: 97.2 F | SYSTOLIC BLOOD PRESSURE: 166 MMHG | HEART RATE: 54 BPM | HEIGHT: 65 IN

## 2022-08-09 DIAGNOSIS — Z12.11 COLON CANCER SCREENING: ICD-10-CM

## 2022-08-09 DIAGNOSIS — R13.10 DYSPHAGIA, UNSPECIFIED TYPE: ICD-10-CM

## 2022-08-09 DIAGNOSIS — K21.00 GASTROESOPHAGEAL REFLUX DISEASE WITH ESOPHAGITIS, UNSPECIFIED WHETHER HEMORRHAGE: ICD-10-CM

## 2022-08-09 PROBLEM — K63.5 POLYP OF COLON: Status: ACTIVE | Noted: 2022-08-09

## 2022-08-09 PROCEDURE — 45384 COLONOSCOPY W/LESION REMOVAL: CPT | Performed by: SURGERY

## 2022-08-09 PROCEDURE — 3609010400 HC COLONOSCOPY POLYPECTOMY HOT BIOPSY: Performed by: SURGERY

## 2022-08-09 PROCEDURE — 6360000002 HC RX W HCPCS: Performed by: NURSE ANESTHETIST, CERTIFIED REGISTERED

## 2022-08-09 PROCEDURE — 2580000003 HC RX 258: Performed by: SURGERY

## 2022-08-09 PROCEDURE — 3700000000 HC ANESTHESIA ATTENDED CARE: Performed by: SURGERY

## 2022-08-09 PROCEDURE — 7100000010 HC PHASE II RECOVERY - FIRST 15 MIN: Performed by: SURGERY

## 2022-08-09 PROCEDURE — 88305 TISSUE EXAM BY PATHOLOGIST: CPT

## 2022-08-09 PROCEDURE — 2709999900 HC NON-CHARGEABLE SUPPLY: Performed by: SURGERY

## 2022-08-09 PROCEDURE — 3609012400 HC EGD TRANSORAL BIOPSY SINGLE/MULTIPLE: Performed by: SURGERY

## 2022-08-09 PROCEDURE — 43239 EGD BIOPSY SINGLE/MULTIPLE: CPT | Performed by: SURGERY

## 2022-08-09 PROCEDURE — 7100000011 HC PHASE II RECOVERY - ADDTL 15 MIN: Performed by: SURGERY

## 2022-08-09 PROCEDURE — 3700000001 HC ADD 15 MINUTES (ANESTHESIA): Performed by: SURGERY

## 2022-08-09 RX ORDER — PROPOFOL 10 MG/ML
INJECTION, EMULSION INTRAVENOUS PRN
Status: DISCONTINUED | OUTPATIENT
Start: 2022-08-09 | End: 2022-08-09 | Stop reason: SDUPTHER

## 2022-08-09 RX ORDER — OMEPRAZOLE 20 MG/1
20 CAPSULE, DELAYED RELEASE ORAL
Qty: 30 CAPSULE | Refills: 3 | Status: SHIPPED | OUTPATIENT
Start: 2022-08-09 | End: 2022-11-07

## 2022-08-09 RX ORDER — SODIUM CHLORIDE, SODIUM LACTATE, POTASSIUM CHLORIDE, CALCIUM CHLORIDE 600; 310; 30; 20 MG/100ML; MG/100ML; MG/100ML; MG/100ML
INJECTION, SOLUTION INTRAVENOUS CONTINUOUS
Status: DISCONTINUED | OUTPATIENT
Start: 2022-08-09 | End: 2022-08-09 | Stop reason: HOSPADM

## 2022-08-09 RX ADMIN — SODIUM CHLORIDE, POTASSIUM CHLORIDE, SODIUM LACTATE AND CALCIUM CHLORIDE: 600; 310; 30; 20 INJECTION, SOLUTION INTRAVENOUS at 07:23

## 2022-08-09 RX ADMIN — PROPOFOL 350 MG: 10 INJECTION, EMULSION INTRAVENOUS at 08:37

## 2022-08-09 RX ADMIN — PROPOFOL 50 MG: 10 INJECTION, EMULSION INTRAVENOUS at 08:36

## 2022-08-09 ASSESSMENT — PAIN - FUNCTIONAL ASSESSMENT: PAIN_FUNCTIONAL_ASSESSMENT: 0-10

## 2022-08-09 ASSESSMENT — PAIN DESCRIPTION - DESCRIPTORS: DESCRIPTORS: BURNING

## 2022-08-09 ASSESSMENT — PAIN SCALES - GENERAL
PAINLEVEL_OUTOF10: 3
PAINLEVEL_OUTOF10: 0

## 2022-08-09 ASSESSMENT — PAIN DESCRIPTION - ORIENTATION: ORIENTATION: RIGHT;LEFT

## 2022-08-09 NOTE — ANESTHESIA POSTPROCEDURE EVALUATION
Department of Anesthesiology  Postprocedure Note    Patient: Fadi Franco  MRN: [id number]  YOB: 1945  Date of evaluation: 8/9/2022      Procedure Summary     Date: 08/09/22 Room / Location: [address]    Anesthesia Start: 3661 Anesthesia Stop: 7948    Procedures:       EGD      COLONOSCOPY POLYPECTOMY HOT BIOPSY Diagnosis:       Gastroesophageal reflux disease with esophagitis, unspecified whether hemorrhage      Dysphagia, unspecified type      Colon cancer screening      (Gastroesophageal reflux disease with esophagitis, unspecified whether hemorrhage [K21.00])      (Dysphagia, unspecified type [R13.10])      (Colon cancer screening [Z12.11])    Surgeons: Liliam Collazo MD Responsible Provider: BABATUNDE Hughes CRNA    Anesthesia Type: general ASA Status: 2          Anesthesia Type: No value filed.     Micaela Phase I: Micaela Score: 10    Micaela Phase II: Micaela Score: 9      Anesthesia Post Evaluation    Patient location during evaluation: PACU  Patient participation: complete - patient participated  Level of consciousness: awake and alert  Pain score: 0  Airway patency: patent  Nausea & Vomiting: no nausea and no vomiting  Complications: no  Cardiovascular status: blood pressure returned to baseline and hemodynamically stable  Respiratory status: acceptable, spontaneous ventilation and room air  Hydration status: euvolemic

## 2022-08-09 NOTE — DISCHARGE INSTRUCTIONS
1. Await biopsy  2. Will add prilosec 20 mg in am for 3 months to see if helps symptoms of GERD. Continue with Pepcid daily-take at night  3. Repeat Colonoscopy in 5 years due to history of polyp       DISCHARGE INSTRUCTIONS FOLLOWING EGD & COLONOSCOPY    Activity  You have received sedation. Your judgement and coordination may be impaired. Do not drive or operate any machinery today. Do not make personal, medical, legal or business decisions today. Do not consume alcohol, tranquilizers or sleeping medications today unless otherwise instructed by your physician. Rest today. You may resume normal activity tomorrow. Because air is put into your stomach and colon during these procedures, it is normal to belch, and pass large amounts of air from your rectum. Feelings of bloating, gas or cramping may persist for 24 hours. You may not have a bowel movement for 1-3 days after these procedures. Diet  Begin with sips of clear liquids and if no nausea, you may progress to your normal diet, unless otherwise instructed. Medications  You may resume your usual medications, unless otherwise instructed. Follow-Up  As instructed. CALL YOUR PHYSICIAN if you experience any of the following:  Bleeding from your rectum (a teaspoonful or more)  Severe abdominal pain/cramping and/or distention that is not relieved by passing gas. Chest pain that is not relieved by belching. Persistent nausea and vomiting. Signs of infection including fever, chills, redness or swelling @ the IV site.     If you have questions/problems, call [phone] [facility]) or after regular business hours call [phone] [facility])

## 2022-08-09 NOTE — H&P
H &P EGD and Colonoscopy  Patient:Aviva Pineda                 :1945  (yes) patient has seen Dr. Jose Harrison prior to procedure  PCP: Dr Clyde Malagon     CC:GERD, dysphagia         Colon cancer screening        HPI:           Colonoscopy  Abd pain: no  Anemia: no  Bloating:no  Diarrhea: no  Constipation: no  Melena: no  Hematochezia:no  Rectal Bleeding:no  Rectal/Anal Pain:no  Pruritus: no  Family history colon Cancer: no  Previous colon cancer: no  Previous Colon Polyp: no  Change in bowels: no  Decrease caliber of stool: no  Change in color of stool: no  Previous work up date: 2012-colonoscopy=moderate sigmoid diverticuosis, Dr. Jose Harrison at [facility]      EGD  Nausea: no  Vomiting: no  Heartburn:occasional  Dysphagia:yes with bread  Hematemesis:yes  Epigastric pain:no  Anemia: no  Previous work up date:-EGD=small hiatal hernia.  Dr. Schuler Scot  Current Treatment:Pepcid 20 mg daily     Family History         Family History   Problem Relation Age of Onset    Emphysema Mother      Cancer Mother           breast    COPD Mother      Breast Cancer Mother      Heart Disease Father      High Blood Pressure Father      Breast Cancer Maternal Grandmother      No Known Problems Paternal Aunt           Social History               Socioeconomic History    Marital status:        Spouse name: Not on file    Number of children: Not on file    Years of education: Not on file    Highest education level: Not on file   Occupational History    Not on file   Tobacco Use    Smoking status: Never Smoker    Smokeless tobacco: Never Used   Substance and Sexual Activity    Alcohol use: No    Drug use: No    Sexual activity: Not on file   Other Topics Concern    Not on file   Social History Narrative    Not on file      Social Determinants of Health          Financial Resource Strain: Low Risk    Difficulty of Paying Living Expenses: Not hard at all   Food Insecurity: No Food Insecurity    Worried About [address] in the Last Year: Never true    Ran Out of Food in the Last Year: Never true   Transportation Needs:    Lack of Transportation (Medical): Not on file    Lack of Transportation (Non-Medical): Not on file   Physical Activity:    Days of Exercise per Week: Not on file    Minutes of Exercise per Session: Not on file   Stress:    Feeling of Stress : Not on file   Social Connections:    Frequency of Communication with Friends and Family: Not on file    Frequency of Social Gatherings with Friends and Family: Not on file    Attends Sabianist Services: Not on file    Active Member of Clubs or Organizations: Not on file    Attends Club or Organization Meetings: Not on file    Marital Status: Not on file   Intimate Partner Violence:    Fear of Current or Ex-Partner: Not on file    Emotionally Abused: Not on file    Physically Abused: Not on file    Sexually Abused: Not on file   Housing Stability:    Unable to Pay for Housing in the Last Year: Not on file    Number of [place] in the Last Year: Not on file    Unstable Housing in the Last Year: Not on file         Past Surgical History         Past Surgical History:   Procedure Laterality Date    BREAST BIOPSY Right 01/31/03     lipoma    COLONOSCOPY   07/11/12     -moderate sigmoid diverticulosis-otherwise normal-repeat 10 years. COLONOSCOPY   10/03/02     -mild erythema in the cecum -moderate diverticulosis sigmoid colon-small internal hemorrhoids-repeat 10 years. DILATION AND CURETTAGE OF UTERUS   07/28/2000    HYSTERECTOMY   04/19/01     SWW-FEN-nzzfg of adhesions. OVARY REMOVAL        TUBAL LIGATION   1973    UPPER GASTROINTESTINAL ENDOSCOPY   12/16/09     -small hiatal hernia otherwise normal    UPPER GASTROINTESTINAL ENDOSCOPY   08/18/99     -moderate size hiatal hernia-mild to moderate grade I-II distal esophagitis. -mild Schatzki's ring         Past Medical History        Past Medical History:   Diagnosis Date    Bean's esophagus      Chronic kidney disease       chronic renal insufficiency    Dizziness      GERD (gastroesophageal reflux disease)      Hyperlipidemia      Hypertension      Measles      Osteoarthritis      Osteoporosis      Seasonal allergies                  Current Outpatient Medications on File Prior to Visit   Medication Sig Dispense Refill    metoprolol succinate (TOPROL XL) 50 MG extended release tablet TAKE 1 TABLET DAILY 90 tablet 3    triamterene-hydroCHLOROthiazide (MAXZIDE-25) 37.5-25 MG per tablet TAKE 1 TABLET DAILY 90 tablet 3    atorvastatin (LIPITOR) 40 MG tablet TAKE 1 TABLET DAILY 90 tablet 3    fexofenadine (ALLEGRA ALLERGY) 180 MG tablet Take 180 mg by mouth daily        ibuprofen (ADVIL;MOTRIN) 200 MG tablet Take 200 mg by mouth nightly as needed for Pain        B Complex Vitamins (VITAMIN B COMPLEX) TABS Take 1 tablet by mouth daily 30 tablet 1    Cholecalciferol (VITAMIN D3) 125 MCG (5000 UT) CAPS Take 1 capsule by mouth daily 30 capsule 1    famotidine (PEPCID) 20 MG tablet Take 20 mg by mouth daily        niacin (SLO-NIACIN) 500 MG extended release tablet Take 500 mg by mouth daily        Calcium Citrate-Vitamin D (CALCIUM CITRATE + D PO) Take 2 tablets by mouth daily        Diphenhydramine-APAP, sleep, (TYLENOL PM EXTRA STRENGTH PO) Take 2 tablets by mouth as needed        Omega-3 Fatty Acids (FISH OIL) 1000 MG CAPS Take 1,000 mg by mouth daily        fluticasone (FLONASE) 50 MCG/ACT nasal spray 2 sprays by Each Nare route daily as needed for Rhinitis        tretinoin (RETIN-A) 0.05 % cream Apply topically daily Indications: 5 times a week Apply topically nightly. aspirin (ECOTRIN LOW STRENGTH) 81 MG EC tablet Take 81 mg by mouth daily. No current facility-administered medications on file prior to visit. Allergies as of 05/03/2022    (No Known Allergies)             PHYSICAL EXAM:    There were no vitals taken for this visit.     Gen:  A and O x 3, NAD, well nourished  Eyes:  Sclera non icterus, PERRL  Lungs:  CTA, symmetrical  Chest:  RRR, no murmurs  Abd:  Soft, NT, ND, no HSM, no bruits                ASSESSMENT:   1. Colon screening  2. Last CS 2012, no hx of polyps, no fam hx colon cancer  3.   Long hx GERD and , long term medical tx last EGD 2009.  4.  Ocaaional heartburn and recent small amount of hematemsis         PLAN:EGD and Colonoscopy

## 2022-08-09 NOTE — ANESTHESIA PRE PROCEDURE
Department of Anesthesiology  Preprocedure Note       Name:  Tameka Taylor   Age:  78 y.o.  :  1945                                          MRN:  [id number]         Date:  2022      Surgeon: Lord Garrido):  Mary Lomas MD    Procedure: Procedure(s):  EGD  Colonoscopy (arrivae 0630-lp)    Medications prior to admission:   Prior to Admission medications    Medication Sig Start Date End Date Taking? Authorizing Provider   metoprolol succinate (TOPROL XL) 50 MG extended release tablet TAKE 1 TABLET DAILY 22   Judie Choudhury MD   triamterene-hydroCHLOROthiazide (MAXZIDE-25) 37.5-25 MG per tablet TAKE 1 TABLET DAILY 3/31/22   Judie Choudhury MD   atorvastatin (LIPITOR) 40 MG tablet TAKE 1 TABLET DAILY 3/3/22   Judie Choudhury MD   fexofenadine (ALLEGRA) 180 MG tablet Take 180 mg by mouth daily    Historical Provider, MD   ibuprofen (ADVIL;MOTRIN) 200 MG tablet Take 200 mg by mouth nightly as needed for Pain    Historical Provider, MD   famotidine (PEPCID) 20 MG tablet Take 20 mg by mouth daily    Historical Provider, MD   niacin (SLO-NIACIN) 500 MG extended release tablet Take 500 mg by mouth daily    Historical Provider, MD   Diphenhydramine-APAP, sleep, (TYLENOL PM EXTRA STRENGTH PO) Take 2 tablets by mouth as needed    Historical Provider, MD   fluticasone (FLONASE) 50 MCG/ACT nasal spray 2 sprays by Each Nare route daily as needed for Rhinitis    Historical Provider, MD   tretinoin (RETIN-A) 0.05 % cream Apply topically daily Indications: 5 times a week Apply topically nightly. Historical Provider, MD   aspirin 81 MG EC tablet Take 81 mg by mouth daily.     Historical Provider, MD       Current medications:    Current Facility-Administered Medications   Medication Dose Route Frequency Provider Last Rate Last Admin    lactated ringers infusion   IntraVENous Continuous Mary Lomas MD 75 mL/hr at 22 New Bag at 22       Allergies:  No Known Allergies    Problem List: consumption: 2300                        Time of last solid consumption: 2300                        Date of last liquid consumption: 08/08/22                        Date of last solid food consumption: 08/07/22    BMI:   Wt Readings from Last 3 Encounters:   08/09/22 141 lb (64 kg)   04/05/22 145 lb (65.8 kg)   10/01/21 145 lb (65.8 kg)     Body mass index is 23.46 kg/m². CBC: No results found for: WBC, RBC, HGB, HCT, MCV, RDW, PLT    CMP:   Lab Results   Component Value Date/Time     03/29/2022 07:41 AM    K 4.8 03/29/2022 07:41 AM    CL 99 03/29/2022 07:41 AM    CO2 30 03/29/2022 07:41 AM    BUN 9 03/29/2022 07:41 AM    CREATININE 0.57 03/29/2022 07:41 AM    GFRAA >60 03/29/2022 07:41 AM    LABGLOM >60 03/29/2022 07:41 AM    GLUCOSE 91 03/29/2022 07:41 AM    PROT 7.1 03/29/2022 07:41 AM    CALCIUM 9.4 03/29/2022 07:41 AM    BILITOT 0.40 03/29/2022 07:41 AM    ALKPHOS 73 03/29/2022 07:41 AM    AST 17 03/29/2022 07:41 AM    ALT 9 03/29/2022 07:41 AM       POC Tests: No results for input(s): POCGLU, POCNA, POCK, POCCL, POCBUN, POCHEMO, POCHCT in the last 72 hours.     Coags: No results found for: PROTIME, INR, APTT    HCG (If Applicable): No results found for: PREGTESTUR, PREGSERUM, HCG, HCGQUANT     ABGs: No results found for: PHART, PO2ART, AZR2EIY, ZDY9IXV, BEART, S5ZKXTAV     Type & Screen (If Applicable):  No results found for: LABABO, LABRH    Drug/Infectious Status (If Applicable):  No results found for: HIV, HEPCAB    COVID-19 Screening (If Applicable): No results found for: COVID19        Anesthesia Evaluation  Patient summary reviewed and Nursing notes reviewed no history of anesthetic complications:   Airway: Mallampati: II  TM distance: >3 FB   Neck ROM: full  Mouth opening: > = 3 FB   Dental:    (+) caps      Pulmonary:Negative Pulmonary ROS and normal exam                               Cardiovascular:  Exercise tolerance: no interval change,   (+) hypertension: no interval change,          Beta Blocker:  Dose within 24 Hrs         Neuro/Psych:   Negative Neuro/Psych ROS              GI/Hepatic/Renal:   (+) GERD: no interval change,           Endo/Other: Negative Endo/Other ROS                    Abdominal:             Vascular: negative vascular ROS. Other Findings:           Anesthesia Plan      general     ASA 2       Induction: intravenous. Anesthetic plan and risks discussed with patient. Use of blood products discussed with patient whom consented to blood products.    Plan discussed with surgical team.                    Lady Domínguez, BABATUNDE - CRNA   8/9/2022

## 2022-08-11 LAB — SURGICAL PATHOLOGY REPORT: NORMAL

## 2022-09-01 ENCOUNTER — TELEPHONE (OUTPATIENT)
Dept: SURGERY | Age: 77
End: 2022-09-01

## 2022-09-01 NOTE — TELEPHONE ENCOUNTER
Patient called the office. Patient is asking for her biopsy results from 8/9/22 EGD/colonoscopy with Dr Zuleyka Maloney.  Call back ph# [phone]

## 2022-09-21 ENCOUNTER — HOSPITAL ENCOUNTER (OUTPATIENT)
Dept: MAMMOGRAPHY | Age: 77
Discharge: HOME | End: 2022-09-23
Payer: MEDICARE

## 2022-09-21 DIAGNOSIS — R92.8 ABNORMAL MAMMOGRAM: ICD-10-CM

## 2022-09-21 PROCEDURE — G0279 TOMOSYNTHESIS, MAMMO: HCPCS

## 2022-09-22 NOTE — RESULT ENCOUNTER NOTE
Patient notified per phone call.
Cranial Nerves II - XII: II: PERRLA; visual fields are full to confrontation III, IV, VI: EOMI, no nystagmus appreciated; V: facial sensation intact to light touch V1-V3 b/l; VII: no ptosis, no facial droop, symmetric eyebrow raise and closure; VIII: hearing intact to finger rub b/l  IX, X: uvula is midline, soft palate rises symmetrically; XI: head turning and shoulder shrug intact b/l; XII: tongue protrusion midline

## 2022-09-24 NOTE — OP NOTE
PROCEDURE NOTE    DATE OF PROCEDURE: 9/24/2022     SURGEON: Dr. Michelle Lockwood    ASSISTANT: None    PREOPERATIVE DIAGNOSIS:    1. Gastroesophageal reflux disease with esophagitis, unspecified whether hemorrhage [K21.00]  Dysphagia, unspecified type [R13.10]  Colon cancer screening [Z12.11]       2. Long hx GERD and medical treatment, last EGD 2009  3. Some heartburn    OPERATION: 1. Upper GI endoscopy with cold biopsy    2. Colonoscopy with hot forceps    ANESTHESIA: IV sedation per CRNA.     ESTIMATED BLOOD LOSS: Less than 10 ml    COMPLICATIONS: None. PROCEDURE # 1:  EGD    PROCEDURE: The patient was given IV conscious sedation per anesthesia. The patient was given 4 L of O2 /minute by nasal cannula. The patient's SPO2 remained above 90% throughout the procedure. The gastroscope was inserted orally and advanced under direct vision through the esophagus, through the stomach, through the pylorus, and into the descending duodenum. Random biopsies were taken in the antrum,body and distal esophagus. The scope was removed and the patient tolerated the procedure well. Findings:    GE at 40 cm  Mild distal esophagitis  Stomach and duodenum normal appearing      PROCEDURE # 2:  COLONOSCOPY      PROCEDURE: The patient was given IV conscious sedation per anesthesia. The patient was given O2 by nasal cannula. The patient's SPO2 remained above 90% throughout the procedure. The colonoscope was inserted per rectum and advanced under direct vision to the cecum without difficulty. The prep was excellent so exam was adequate. The colon was decompressed and the scope was removed. The patient tolerated the procedure well. Findings:    1.  5 - 8 mm sessile polyp in cecum, removed with hot forceps  2. Moderate sigmoid diverticulosis      PLAN:  1. Await bx  2. Will add prilosec 20 mg in am for 3 months to see if helps symptoms of GERD.   3.  Repeat CS in 5 years due to hx of polyp    ADDENDUM:  Endo Review : 9/24/2022    Pathology:    A.  STOMACH, ANTRUM, BIOPSY:        -  NORMAL GASTRIC MUCOSA. B.  STOMACH, BODY, BIOPSY:        -  NORMAL GASTRIC MUCOSA. C.  DISTAL ESOPHAGUS, BIOPSY:        -  NORMAL SQUAMOUS AND GASTRIC TYPE GLANDULAR MUCOSA. D.  CECUM, POLYP, BIOPSY:        -  ADENOMATOUS POLYP (TUBULAR ADENOMA). b  Plan:    1. Bxs all were fairly normal in stomach and esophagus. So will proceed with plan in OP note plan above. Will see if the addition of prilosec for 3 months will help. If not should follow up with us.   2.  Then repeat CS in 5 years as above           Electronically signed by Richard Mesa MD  on 9/24/2022 at 5:58 PM

## 2022-09-27 ENCOUNTER — TELEPHONE (OUTPATIENT)
Dept: SURGERY | Age: 77
End: 2022-09-27

## 2022-09-27 NOTE — LETTER
[facility]  [address], [place] [address]  Phone [phone]  Fax [phone]      Dr. Camryn Stark    9/27/2022    Dear Jaja Bae,    Dr. Camryn Stark reviewed the results of your recent Colonoscopy and EGD on 8/9/2022. The Colonoscopy showed moderate sigmoid diverticulosis and you had 1 benign (no cancer) polyp removed from the colon. The EGD showed mild inflammation in the esophagus and the biopsies he took were normal.    His recommendations are to repeat the Colonoscopy in 5 years, due to history of colon polyps. Continue taking the Prilosec 20 mg in the AM for 3 months to see if helps symptoms. If no improvement contact our office to schedule a follow up appointment with Dr. Camryn Stark. If you have any questions or concerns regarding your test results or our recommendations, please call the office at [phone].     Sincerely,      Staci Power RN

## 2022-09-27 NOTE — TELEPHONE ENCOUNTER
Letter created and mailed to patient with results from recent CS and [facility] with Dr. Chandra Malhotra on 8/9/2022. Updated history, health maintenance, and recall. Forwarded results to PCP.

## 2022-10-10 ENCOUNTER — OFFICE VISIT (OUTPATIENT)
Dept: INTERNAL MEDICINE | Age: 77
End: 2022-10-10
Payer: MEDICARE

## 2022-10-10 VITALS
HEART RATE: 52 BPM | HEIGHT: 65 IN | SYSTOLIC BLOOD PRESSURE: 118 MMHG | WEIGHT: 140 LBS | DIASTOLIC BLOOD PRESSURE: 70 MMHG | RESPIRATION RATE: 16 BRPM | BODY MASS INDEX: 23.32 KG/M2 | OXYGEN SATURATION: 96 %

## 2022-10-10 DIAGNOSIS — K21.9 GASTROESOPHAGEAL REFLUX DISEASE WITHOUT ESOPHAGITIS: ICD-10-CM

## 2022-10-10 DIAGNOSIS — Z00.00 GENERAL MEDICAL EXAM: Primary | ICD-10-CM

## 2022-10-10 DIAGNOSIS — Z00.00 MEDICARE ANNUAL WELLNESS VISIT, SUBSEQUENT: ICD-10-CM

## 2022-10-10 DIAGNOSIS — E78.49 OTHER HYPERLIPIDEMIA: ICD-10-CM

## 2022-10-10 DIAGNOSIS — I10 PRIMARY HYPERTENSION: ICD-10-CM

## 2022-10-10 PROCEDURE — 99214 OFFICE O/P EST MOD 30 MIN: CPT | Performed by: INTERNAL MEDICINE

## 2022-10-10 PROCEDURE — G0439 PPPS, SUBSEQ VISIT: HCPCS | Performed by: INTERNAL MEDICINE

## 2022-10-10 PROCEDURE — 1123F ACP DISCUSS/DSCN MKR DOCD: CPT | Performed by: INTERNAL MEDICINE

## 2022-10-10 PROCEDURE — 99213 OFFICE O/P EST LOW 20 MIN: CPT | Performed by: INTERNAL MEDICINE

## 2022-10-10 PROCEDURE — G0008 ADMIN INFLUENZA VIRUS VAC: HCPCS | Performed by: INTERNAL MEDICINE

## 2022-10-10 PROCEDURE — PBSHW INFLUENZA, FLUAD, (AGE 65 Y+), IM, PF, 0.5 ML: Performed by: INTERNAL MEDICINE

## 2022-10-10 RX ORDER — PYRIDOXINE HCL (VITAMIN B6) 100 MG
1 TABLET ORAL
COMMUNITY

## 2022-10-10 SDOH — ECONOMIC STABILITY: FOOD INSECURITY: WITHIN THE PAST 12 MONTHS, YOU WORRIED THAT YOUR FOOD WOULD RUN OUT BEFORE YOU GOT MONEY TO BUY MORE.: NEVER TRUE

## 2022-10-10 SDOH — ECONOMIC STABILITY: FOOD INSECURITY: WITHIN THE PAST 12 MONTHS, THE FOOD YOU BOUGHT JUST DIDN'T LAST AND YOU DIDN'T HAVE MONEY TO GET MORE.: NEVER TRUE

## 2022-10-10 ASSESSMENT — PATIENT HEALTH QUESTIONNAIRE - PHQ9
SUM OF ALL RESPONSES TO PHQ QUESTIONS 1-9: 0
2. FEELING DOWN, DEPRESSED OR HOPELESS: 0
SUM OF ALL RESPONSES TO PHQ QUESTIONS 1-9: 0
SUM OF ALL RESPONSES TO PHQ9 QUESTIONS 1 & 2: 0
1. LITTLE INTEREST OR PLEASURE IN DOING THINGS: 0
SUM OF ALL RESPONSES TO PHQ QUESTIONS 1-9: 0
SUM OF ALL RESPONSES TO PHQ QUESTIONS 1-9: 0

## 2022-10-10 ASSESSMENT — ENCOUNTER SYMPTOMS
DIARRHEA: 0
SHORTNESS OF BREATH: 0
ABDOMINAL PAIN: 0
CONSTIPATION: 0
NAUSEA: 0
EYE PAIN: 0
BACK PAIN: 0
BLOOD IN STOOL: 0
VOMITING: 0
HEARTBURN: 1
COUGH: 0

## 2022-10-10 ASSESSMENT — LIFESTYLE VARIABLES
HOW MANY STANDARD DRINKS CONTAINING ALCOHOL DO YOU HAVE ON A TYPICAL DAY: PATIENT DOES NOT DRINK
HOW OFTEN DO YOU HAVE A DRINK CONTAINING ALCOHOL: NEVER

## 2022-10-10 ASSESSMENT — SOCIAL DETERMINANTS OF HEALTH (SDOH): HOW HARD IS IT FOR YOU TO PAY FOR THE VERY BASICS LIKE FOOD, HOUSING, MEDICAL CARE, AND HEATING?: NOT HARD AT ALL

## 2022-10-10 NOTE — PROGRESS NOTES
[place] [address]  Dept: [phone]  Dept Fax: [phone]  Loc: [phone]     Jennyfer Braswell is a 78 y.o. female who presents today for her medical conditions/complaintsas noted below. Jennyfer Braswell is c/o of   Chief Complaint   Patient presents with    Other     GME, leg swelling consistently     Hypertension    Hyperlipidemia    Gastroesophageal Reflux         HPI:     Other  This is a recurrent (1- general medical exam) problem. The current episode started today. The problem occurs intermittently. The problem has been waxing and waning. Pertinent negatives include no abdominal pain, arthralgias, chest pain, chills, coughing, fever, headaches, nausea, neck pain, numbness, rash, vomiting or weakness. Hypertension  This is a chronic problem. The current episode started more than 1 year ago. The problem has been waxing and waning since onset. The problem is controlled. Pertinent negatives include no chest pain, headaches, neck pain, palpitations or shortness of breath. Hyperlipidemia  This is a chronic problem. The current episode started more than 1 year ago. The problem is controlled. Recent lipid tests were reviewed and are variable. Pertinent negatives include no chest pain or shortness of breath. Gastroesophageal Reflux  She complains of heartburn. She reports no abdominal pain, no chest pain, no coughing or no nausea. This is a recurrent problem. The current episode started more than 1 year ago. The problem occurs rarely. The problem has been waxing and waning.      No results found for: LABA1C         No results found for: LABMICR  LDL Cholesterol (mg/dL)   Date Value   03/29/2022 70   03/22/2021 73   03/11/2020 77     LDL Calculated (mg/dL)   Date Value   09/17/2018 78   03/15/2018 58   11/09/2017 77         AST (U/L)   Date Value   03/29/2022 17     ALT (U/L)   Date Value 03/29/2022 9     BUN (mg/dL)   Date Value   03/29/2022 9     BP Readings from Last 3 Encounters:   10/10/22 118/70   08/09/22 (!) 166/86   04/05/22 128/78              Past Medical History:   Diagnosis Date    Bean's esophagus     Chronic kidney disease     chronic renal insufficiency    Dizziness     GERD (gastroesophageal reflux disease)     Hyperlipidemia     Hypertension     Measles     Osteoarthritis     Osteoporosis     Seasonal allergies       Past Surgical History:   Procedure Laterality Date    BREAST BIOPSY Right 01/31/2003    lipoma    CATARACT EXTRACTION, BILATERAL      COLONOSCOPY  07/11/2012    -moderate sigmoid diverticulosis-otherwise normal-repeat 10 years. COLONOSCOPY  10/03/2002    -mild erythema in the cecum -moderate diverticulosis sigmoid colon-small internal hemorrhoids-repeat 10 years. COLONOSCOPY N/A 08/09/2022    moderate sigmoid diverticulosis, tubular adenoma x1; Dr. Sedrick Sinha at [place] [address]  07/28/2000    HYSTERECTOMY (CERVIX STATUS UNKNOWN)  04/19/2001    [id number] of adhesions. OVARY REMOVAL      TUBAL LIGATION  1973    UPPER GASTROINTESTINAL ENDOSCOPY  12/16/2009    -small hiatal hernia otherwise normal    UPPER GASTROINTESTINAL ENDOSCOPY  08/18/1999    -moderate size hiatal hernia-mild to moderate grade I-II distal esophagitis. -mild Schatzki's ring    UPPER GASTROINTESTINAL ENDOSCOPY N/A 08/09/2022    mild distal esophagitis, bx normal; Dr[facility] Rich Cortez at [facility]       Family History   Problem Relation Age of Onset    Emphysema Mother     Cancer Mother         breast    COPD Mother     Breast Cancer Mother     Heart Disease Father     High Blood Pressure Father     Breast Cancer Maternal Grandmother     No Known Problems Paternal Aunt           Social History     Tobacco Use    Smoking status: Never    Smokeless tobacco: Never   Substance Use Topics    Alcohol use: No         Current Outpatient Medications   Medication Sig Dispense Refill    Calcium 500-125 MG-UNIT TABS 1 tablet      omeprazole (PRILOSEC) 20 MG delayed release capsule Take 1 capsule by mouth every morning (before breakfast) 30 capsule 3    metoprolol succinate (TOPROL XL) 50 MG extended release tablet TAKE 1 TABLET DAILY 90 tablet 3    triamterene-hydroCHLOROthiazide (MAXZIDE-25) 37.5-25 MG per tablet TAKE 1 TABLET DAILY 90 tablet 3    atorvastatin (LIPITOR) 40 MG tablet TAKE 1 TABLET DAILY 90 tablet 3    fexofenadine (ALLEGRA) 180 MG tablet Take 180 mg by mouth daily      niacin (SLO-NIACIN) 500 MG extended release tablet Take 500 mg by mouth daily      Diphenhydramine-APAP, sleep, (TYLENOL PM EXTRA STRENGTH PO) Take 2 tablets by mouth as needed      fluticasone (FLONASE) 50 MCG/ACT nasal spray 2 sprays by Each Nare route daily as needed for Rhinitis      tretinoin (RETIN-A) 0.05 % cream Apply topically daily Indications: 5 times a week Apply topically nightly. aspirin 81 MG EC tablet Take 81 mg by mouth daily. ibuprofen (ADVIL;MOTRIN) 200 MG tablet Take 200 mg by mouth nightly as needed for Pain (Patient not taking: Reported on 10/10/2022)      famotidine (PEPCID) 20 MG tablet Take 20 mg by mouth daily (Patient not taking: Reported on 10/10/2022)       No current facility-administered medications for this visit.      No Known Allergies    Health Maintenance   Topic Date Due    Hepatitis C screen  Never done    Shingles vaccine (2 of 3) 10/01/2012    COVID-19 Vaccine (4 - Booster for Moderna series) 03/17/2022    Flu vaccine (1) 08/01/2022    Depression Screen  10/01/2022    Lipids  03/29/2023    Annual Wellness Visit (AWV)  10/11/2023    Colorectal Cancer Screen  08/09/2027    DTaP/Tdap/Td vaccine (2 - Td or Tdap) 05/17/2028    DEXA (modify frequency per FRAX score)  Completed    Pneumococcal 65+ years Vaccine  Completed    Hepatitis A vaccine  Aged Out    Hib vaccine  Aged Out    Meningococcal (ACWY) vaccine  Aged Out       Subjective:      Review of Systems   Constitutional:  Negative for chills and fever. HENT:  Negative for hearing loss. Eyes:  Negative for pain and visual disturbance. Respiratory:  Negative for cough and shortness of breath. Cardiovascular:  Negative for chest pain, palpitations and leg swelling. Gastrointestinal:  Positive for heartburn. Negative for abdominal pain, blood in stool, constipation, diarrhea, nausea and vomiting. Endocrine: Negative for cold intolerance, polydipsia and polyuria. Genitourinary:  Negative for difficulty urinating, dysuria and hematuria. Musculoskeletal:  Negative for arthralgias, back pain, gait problem and neck pain. Skin:  Negative for pallor and rash. Neurological:  Negative for dizziness, weakness, numbness and headaches. Hematological:  Negative for adenopathy. Does not bruise/bleed easily. Psychiatric/Behavioral:  Negative for confusion. The patient is not nervous/anxious. Objective:     Physical Exam  Vitals reviewed. Constitutional:       Appearance: She is well-developed. HENT:      Head: Normocephalic and atraumatic. Eyes:      Pupils: Pupils are equal, round, and reactive to light. Cardiovascular:      Rate and Rhythm: Normal rate and regular rhythm. Heart sounds: No murmur heard. No friction rub. No gallop. Pulmonary:      Effort: Pulmonary effort is normal.      Breath sounds: Normal breath sounds. No wheezing or rales. Abdominal:      General: There is no distension. Palpations: Abdomen is soft. There is no mass. Tenderness: There is no abdominal tenderness. There is no rebound. Musculoskeletal:         General: Normal range of motion. Cervical back: Neck supple. Lymphadenopathy:      Cervical: No cervical adenopathy. Skin:     General: Skin is warm and dry. Findings: No rash. Neurological:      Mental Status: She is alert and oriented to person, place, and time. Cranial Nerves: No cranial nerve deficit (grossly). Psychiatric:         Thought Content: Thought content normal.      /70 (Site: Left Upper Arm, Position: Sitting, Cuff Size: Large Adult)   Pulse 52   Resp 16   Ht 5' 5\" (1.651 m)   Wt 140 lb (63.5 kg)   LMP  (LMP Unknown)   SpO2 96%   BMI 23.30 kg/m²     Assessment:       Diagnosis Orders   1. General medical exam  Comprehensive Metabolic Panel      2. Medicare annual wellness visit, subsequent        3. Gastroesophageal reflux disease without esophagitis        4. Other hyperlipidemia  Lipid Panel      5. Primary hypertension  Comprehensive Metabolic Panel      Multiple test results for this appointment. 3/29/2022 normal total cholesterol 124    8/9/2022 EGD showed only mild esophagitis, otherwise normal.    9/21/2022 mammogram benign    Patient told to continue Lipitor. Plan:       Return in about 6 months (around 4/14/2023) for Hypertension, Hyperlipidemia. Orders Placed This Encounter   Procedures    Influenza, FLUAD, (age 72 y+), IM, Preservative Free, 0.5 mL    Comprehensive Metabolic Panel     Standing Status:   Future     Standing Expiration Date:   4/10/2024    Lipid Panel     Standing Status:   Future     Standing Expiration Date:   4/10/2024     Order Specific Question:   Is Patient Fasting?/# of Hours     Answer:   15     Order Specific Question:   Has the patient fasted? Answer:   Yes     No orders of the defined types were placed in this encounter. Patientgiven educational materials - see patient instructions. Discussed use, benefit,and side effects of prescribed medications. All patient questions answered. Ptvoiced understanding. Reviewed health maintenance. Instructed to continue currentmedications, diet and exercise. Patient agreed with treatment plan. Follow up asdirected.      Electronically signed by Chuck Corcoran MD on 10/10/2022 at 8:59 AM

## 2022-10-10 NOTE — PATIENT INSTRUCTIONS
Personalized Preventive Plan for Pia Gray - 10/10/2022  Medicare offers a range of preventive health benefits. Some of the tests and screenings are paid in full while other may be subject to a deductible, co-insurance, and/or copay. Some of these benefits include a comprehensive review of your medical history including lifestyle, illnesses that may run in your family, and various assessments and screenings as appropriate. After reviewing your medical record and screening and assessments performed today your provider may have ordered immunizations, labs, imaging, and/or referrals for you. A list of these orders (if applicable) as well as your Preventive Care list are included within your After Visit Summary for your review. Other Preventive Recommendations:    A preventive eye exam performed by an eye specialist is recommended every 1-2 years to screen for glaucoma; cataracts, macular degeneration, and other eye disorders. A preventive dental visit is recommended every 6 months. Try to get at least 150 minutes of exercise per week or 10,000 steps per day on a pedometer . Order or download the FREE \"Exercise & Physical Activity: Your Everyday Guide\" from [employer]. Call 0-587.273.1511 or search [employer] online. You need 1903-8801 mg of calcium and 6789-6862 IU of vitamin D per day. It is possible to meet your calcium requirement with diet alone, but a vitamin D supplement is usually necessary to meet this goal.  When exposed to the sun, use a sunscreen that protects against both UVA and UVB radiation with an SPF of 30 or greater. Reapply every 2 to 3 hours or after sweating, drying off with a towel, or swimming. Always wear a seat belt when traveling in a car. Always wear a helmet when riding a bicycle or motorcycle.

## 2022-10-10 NOTE — PROGRESS NOTES
Medicare Annual Wellness Visit    Alessandra Jackson is here for Other (GME, leg swelling consistently ), Hypertension, Hyperlipidemia, and Gastroesophageal Reflux    Assessment & Plan   General medical exam  -     Comprehensive Metabolic Panel; Future  Medicare annual wellness visit, subsequent  Gastroesophageal reflux disease without esophagitis  Other hyperlipidemia  -     Lipid Panel; Future  Primary hypertension  -     Comprehensive Metabolic Panel; Future    Recommendations for Preventive Services Due: see orders and patient instructions/AVS.  Recommended screening schedule for the next 5-10 years is provided to the patient in written form: see Patient Instructions/AVS.     Return in about 6 months (around 4/14/2023) for Hypertension, Hyperlipidemia. Subjective   The following acute and/or chronic problems were also addressed today    Patient's complete Health Risk Assessment and screening values have been reviewed and are found in Flowsheets. The following problems were reviewed today and where indicated follow up appointments were made and/or referrals ordered. Positive Risk Factor Screenings with Interventions:             General Health and ACP:  General  In general, how would you say your health is?: Very Good  In the past 7 days, have you experienced any of the following: New or Increased Pain, New or Increased Fatigue, Loneliness, Social Isolation, Stress or Anger?: No  Do you get the social and emotional support that you need?: Yes  Do you have a Living Will?: Yes    Advance Directives       Power of  Living Will ACP-Advance Directive ACP-Power of     Not on File Not on File Not on File Not on File          General Health Risk Interventions:  .       Safety:  Do you have working smoke detectors?: Yes  Do you have any tripping hazards - loose or unsecured carpets or rugs?: (!) Yes  Do you have any tripping hazards - clutter in doorways, halls, or stairs?: No  Do you have either shower bars, grab bars, non-slip mats or non-slip surfaces in your shower or bathtub?: Yes  Do all of your stairways have a railing or banister?: Not Applicable  Do you always fasten your seatbelt when you are in a car?: Yes  Safety Interventions:  Home safety tips provided           Objective   Vitals:    10/10/22 0833   BP: 118/70   Site: Left Upper Arm   Position: Sitting   Cuff Size: Large Adult   Pulse: 52   Resp: 16   SpO2: 96%   Weight: 140 lb (63.5 kg)   Height: 5' 5\" (1.651 m)      Body mass index is 23.3 kg/m². No Known Allergies  Prior to Visit Medications    Medication Sig Taking? Authorizing Provider   Calcium 500-125 MG-UNIT TABS 1 tablet Yes Historical Provider, MD   omeprazole (PRILOSEC) 20 MG delayed release capsule Take 1 capsule by mouth every morning (before breakfast) Yes Robert Naranjo MD   metoprolol succinate (TOPROL XL) 50 MG extended release tablet TAKE 1 TABLET DAILY Yes Virgen Barnes MD   triamterene-hydroCHLOROthiazide (MAXZIDE-25) 37.5-25 MG per tablet TAKE 1 TABLET DAILY Yes Virgen Barnes MD   atorvastatin (LIPITOR) 40 MG tablet TAKE 1 TABLET DAILY Yes Virgen Barnes MD   fexofenadine (ALLEGRA) 180 MG tablet Take 180 mg by mouth daily Yes Historical Provider, MD   niacin (SLO-NIACIN) 500 MG extended release tablet Take 500 mg by mouth daily Yes Historical Provider, MD   Diphenhydramine-APAP, sleep, (TYLENOL PM EXTRA STRENGTH PO) Take 2 tablets by mouth as needed Yes Historical Provider, MD   fluticasone (FLONASE) 50 MCG/ACT nasal spray 2 sprays by Each Nare route daily as needed for Rhinitis Yes Historical Provider, MD   tretinoin (RETIN-A) 0.05 % cream Apply topically daily Indications: 5 times a week Apply topically nightly. Yes Historical Provider, MD   aspirin 81 MG EC tablet Take 81 mg by mouth daily.  Yes Historical Provider, MD   ibuprofen (ADVIL;MOTRIN) 200 MG tablet Take 200 mg by mouth nightly as needed for Pain  Patient not taking: Reported on 10/10/2022 Historical Provider, MD   famotidine (PEPCID) 20 MG tablet Take 20 mg by mouth daily  Patient not taking: Reported on 10/10/2022  Historical Provider, MD Ortega (Including outside providers/suppliers regularly involved in providing care):   Patient Care Team:  Denise Garcia MD as PCP - General (Internal Medicine)  Denise Garcia MD as PCP - [facility] Empaneled Provider     Reviewed and updated this visit:  Tobacco  Allergies  Meds  Problems  Med Hx  Surg Hx  Soc Hx  Fam Hx                 I, Dr. Austen Lugo, directly supervised the performance of this Medicare annual wellness visit.

## 2023-03-28 RX ORDER — METOPROLOL SUCCINATE 50 MG/1
50 TABLET, EXTENDED RELEASE ORAL DAILY
Qty: 90 TABLET | Refills: 3 | Status: SHIPPED | OUTPATIENT
Start: 2023-03-28

## 2023-04-03 ENCOUNTER — HOSPITAL ENCOUNTER (OUTPATIENT)
Age: 78
Discharge: HOME | End: 2023-04-03
Payer: MEDICARE

## 2023-04-03 DIAGNOSIS — E78.49 OTHER HYPERLIPIDEMIA: ICD-10-CM

## 2023-04-03 DIAGNOSIS — E78.49 OTHER HYPERLIPIDEMIA: Primary | ICD-10-CM

## 2023-04-03 LAB
ALBUMIN SERPL-MCNC: 4.2 G/DL (ref 3.5–5.2)
ALBUMIN/GLOBULIN RATIO: 1.3 (ref 1–2.5)
ALP SERPL-CCNC: 67 U/L (ref 35–104)
ALT SERPL-CCNC: 11 U/L (ref 5–33)
ANION GAP SERPL CALCULATED.3IONS-SCNC: 12 MMOL/L (ref 9–17)
AST SERPL-CCNC: 19 U/L
BILIRUB SERPL-MCNC: 0.6 MG/DL (ref 0.3–1.2)
BUN SERPL-MCNC: 11 MG/DL (ref 8–23)
BUN/CREAT BLD: 19 (ref 9–20)
CALCIUM SERPL-MCNC: 10 MG/DL (ref 8.6–10.4)
CHLORIDE SERPL-SCNC: 96 MMOL/L (ref 98–107)
CHOLEST SERPL-MCNC: 135 MG/DL
CHOLESTEROL/HDL RATIO: 4.2
CO2 SERPL-SCNC: 29 MMOL/L (ref 20–31)
CREAT SERPL-MCNC: 0.58 MG/DL (ref 0.5–0.9)
GFR SERPL CREATININE-BSD FRML MDRD: >60 ML/MIN/1.73M2
GLUCOSE SERPL-MCNC: 81 MG/DL (ref 70–99)
HDLC SERPL-MCNC: 32 MG/DL
LDLC SERPL CALC-MCNC: 82 MG/DL (ref 0–130)
POTASSIUM SERPL-SCNC: 3.7 MMOL/L (ref 3.7–5.3)
PROT SERPL-MCNC: 7.5 G/DL (ref 6.4–8.3)
SODIUM SERPL-SCNC: 137 MMOL/L (ref 135–144)
TRIGL SERPL-MCNC: 105 MG/DL

## 2023-04-03 PROCEDURE — 80061 LIPID PANEL: CPT

## 2023-04-03 PROCEDURE — 80053 COMPREHEN METABOLIC PANEL: CPT

## 2023-04-03 PROCEDURE — 36415 COLL VENOUS BLD VENIPUNCTURE: CPT

## 2023-05-22 DIAGNOSIS — E78.49 OTHER HYPERLIPIDEMIA: ICD-10-CM

## 2023-05-22 NOTE — TELEPHONE ENCOUNTER
Lyn rBambila called requesting a refill of the below medication which has been pended for you:     Requested Prescriptions     Pending Prescriptions Disp Refills    atorvastatin (LIPITOR) 40 MG tablet 90 tablet 3     Sig: Take 1 tablet by mouth daily    triamterene-hydroCHLOROthiazide (MAXZIDE-25) 37.5-25 MG per tablet 90 tablet 3     Sig: Take 1 tablet by mouth daily       Last Appointment Date: 4/11/2023  Next Appointment Date: 10/19/2023    No Known Allergies

## 2023-05-23 RX ORDER — ATORVASTATIN CALCIUM 40 MG/1
40 TABLET, FILM COATED ORAL DAILY
Qty: 90 TABLET | Refills: 3 | Status: SHIPPED | OUTPATIENT
Start: 2023-05-23

## 2023-05-23 RX ORDER — TRIAMTERENE AND HYDROCHLOROTHIAZIDE 37.5; 25 MG/1; MG/1
1 TABLET ORAL DAILY
Qty: 90 TABLET | Refills: 3 | Status: SHIPPED | OUTPATIENT
Start: 2023-05-23

## 2023-06-19 ENCOUNTER — OFFICE VISIT (OUTPATIENT)
Dept: PRIMARY CARE CLINIC | Age: 78
End: 2023-06-19
Payer: MEDICARE

## 2023-06-19 ENCOUNTER — HOSPITAL ENCOUNTER (OUTPATIENT)
Dept: CT IMAGING | Age: 78
Discharge: HOME | End: 2023-06-21
Payer: MEDICARE

## 2023-06-19 ENCOUNTER — HOSPITAL ENCOUNTER (OUTPATIENT)
Age: 78
Discharge: HOME | End: 2023-06-19
Payer: MEDICARE

## 2023-06-19 VITALS
HEART RATE: 94 BPM | OXYGEN SATURATION: 98 % | SYSTOLIC BLOOD PRESSURE: 130 MMHG | WEIGHT: 139 LBS | HEIGHT: 65 IN | DIASTOLIC BLOOD PRESSURE: 90 MMHG | RESPIRATION RATE: 18 BRPM | TEMPERATURE: 99 F | BODY MASS INDEX: 23.16 KG/M2

## 2023-06-19 DIAGNOSIS — R10.9 FLANK PAIN: ICD-10-CM

## 2023-06-19 DIAGNOSIS — R52 BODY ACHES: ICD-10-CM

## 2023-06-19 DIAGNOSIS — N10 ACUTE PYELONEPHRITIS: Primary | ICD-10-CM

## 2023-06-19 LAB
BACTERIA URNS QL MICRO: ABNORMAL
BILIRUB UR QL STRIP: NEGATIVE
CHARACTER UR: ABNORMAL
CLARITY UR: CLEAR
COLOR UR: YELLOW
EPI CELLS #/AREA URNS HPF: ABNORMAL /HPF (ref 0–5)
GLUCOSE UR STRIP-MCNC: NEGATIVE MG/DL
HGB UR QL STRIP.AUTO: ABNORMAL
KETONES UR STRIP-MCNC: ABNORMAL MG/DL
LEUKOCYTE ESTERASE UR QL STRIP: ABNORMAL
NITRITE UR QL STRIP: NEGATIVE
PH UR STRIP: 7.5 [PH] (ref 5–6)
PROT UR STRIP-MCNC: ABNORMAL MG/DL
RBC #/AREA URNS HPF: ABNORMAL /HPF (ref 0–4)
SP GR UR STRIP: 1.01 (ref 1.01–1.02)
UROBILINOGEN UR STRIP-ACNC: NORMAL
WBC #/AREA URNS HPF: ABNORMAL /HPF (ref 0–4)

## 2023-06-19 PROCEDURE — G8427 DOCREV CUR MEDS BY ELIG CLIN: HCPCS | Performed by: STUDENT IN AN ORGANIZED HEALTH CARE EDUCATION/TRAINING PROGRAM

## 2023-06-19 PROCEDURE — G8420 CALC BMI NORM PARAMETERS: HCPCS | Performed by: STUDENT IN AN ORGANIZED HEALTH CARE EDUCATION/TRAINING PROGRAM

## 2023-06-19 PROCEDURE — 1090F PRES/ABSN URINE INCON ASSESS: CPT | Performed by: STUDENT IN AN ORGANIZED HEALTH CARE EDUCATION/TRAINING PROGRAM

## 2023-06-19 PROCEDURE — 99202 OFFICE O/P NEW SF 15 MIN: CPT | Performed by: STUDENT IN AN ORGANIZED HEALTH CARE EDUCATION/TRAINING PROGRAM

## 2023-06-19 PROCEDURE — 87186 SC STD MICRODIL/AGAR DIL: CPT

## 2023-06-19 PROCEDURE — 99203 OFFICE O/P NEW LOW 30 MIN: CPT | Performed by: STUDENT IN AN ORGANIZED HEALTH CARE EDUCATION/TRAINING PROGRAM

## 2023-06-19 PROCEDURE — 3017F COLORECTAL CA SCREEN DOC REV: CPT | Performed by: STUDENT IN AN ORGANIZED HEALTH CARE EDUCATION/TRAINING PROGRAM

## 2023-06-19 PROCEDURE — 74176 CT ABD & PELVIS W/O CONTRAST: CPT

## 2023-06-19 PROCEDURE — G8399 PT W/DXA RESULTS DOCUMENT: HCPCS | Performed by: STUDENT IN AN ORGANIZED HEALTH CARE EDUCATION/TRAINING PROGRAM

## 2023-06-19 PROCEDURE — 1036F TOBACCO NON-USER: CPT | Performed by: STUDENT IN AN ORGANIZED HEALTH CARE EDUCATION/TRAINING PROGRAM

## 2023-06-19 PROCEDURE — 1123F ACP DISCUSS/DSCN MKR DOCD: CPT | Performed by: STUDENT IN AN ORGANIZED HEALTH CARE EDUCATION/TRAINING PROGRAM

## 2023-06-19 PROCEDURE — 87088 URINE BACTERIA CULTURE: CPT

## 2023-06-19 PROCEDURE — 81001 URINALYSIS AUTO W/SCOPE: CPT

## 2023-06-19 PROCEDURE — 3075F SYST BP GE 130 - 139MM HG: CPT | Performed by: STUDENT IN AN ORGANIZED HEALTH CARE EDUCATION/TRAINING PROGRAM

## 2023-06-19 PROCEDURE — 3080F DIAST BP >= 90 MM HG: CPT | Performed by: STUDENT IN AN ORGANIZED HEALTH CARE EDUCATION/TRAINING PROGRAM

## 2023-06-19 PROCEDURE — 87086 URINE CULTURE/COLONY COUNT: CPT

## 2023-06-19 RX ORDER — SULFAMETHOXAZOLE AND TRIMETHOPRIM 800; 160 MG/1; MG/1
1 TABLET ORAL 2 TIMES DAILY
Qty: 14 TABLET | Refills: 0 | Status: SHIPPED | OUTPATIENT
Start: 2023-06-19 | End: 2023-06-26

## 2023-06-19 ASSESSMENT — ENCOUNTER SYMPTOMS
ABDOMINAL PAIN: 0
VOMITING: 0
BLOOD IN STOOL: 0
SHORTNESS OF BREATH: 0
CONSTIPATION: 0
NAUSEA: 0
DIARRHEA: 0
TROUBLE SWALLOWING: 0
COUGH: 0
EYE PAIN: 0

## 2023-06-19 NOTE — PROGRESS NOTES
3    ibuprofen (ADVIL;MOTRIN) 200 MG tablet Take 200 mg by mouth nightly as needed for Pain (Patient not taking: Reported on 10/10/2022)       No current facility-administered medications for this visit. She has No Known Allergies. She  reports that she has never smoked. She has never used smokeless tobacco.      Objective:    Vitals:    06/19/23 1149   BP: (!) 130/90   Pulse: 94   Resp: 18   Temp: 99 °F (37.2 °C)   SpO2: 98%   Weight: 139 lb (63 kg)   Height: 5' 5\" (1.651 m)     Body mass index is 23.13 kg/m². Physical Exam  Vitals and nursing note reviewed. Constitutional:       General: She is not in acute distress. Appearance: She is well-developed. She is not diaphoretic. HENT:      Head: Normocephalic and atraumatic. Right Ear: External ear normal.      Left Ear: External ear normal.      Nose: Nose normal.   Eyes:      General: No scleral icterus. Right eye: No discharge. Left eye: No discharge. Conjunctiva/sclera: Conjunctivae normal.   Cardiovascular:      Rate and Rhythm: Normal rate and regular rhythm. Heart sounds: Normal heart sounds. No murmur heard. Pulmonary:      Effort: Pulmonary effort is normal.      Breath sounds: Normal breath sounds. Abdominal:      Tenderness: There is left CVA tenderness. Musculoskeletal:      Cervical back: Normal range of motion. Skin:     General: Skin is warm and dry. Findings: No erythema or rash. Neurological:      Mental Status: She is alert and oriented to person, place, and time. Cranial Nerves: No cranial nerve deficit. Psychiatric:         Behavior: Behavior normal.         Thought Content:  Thought content normal.         Judgment: Judgment normal.             (Please note that portions of this note were completed with a voice-recognition program. Efforts were made to edit the dictation but occasionally words are mis-transcribed.)

## 2023-06-21 LAB
MICROORGANISM SPEC CULT: ABNORMAL
SPECIMEN DESCRIPTION: ABNORMAL

## 2023-09-22 ENCOUNTER — HOSPITAL ENCOUNTER (OUTPATIENT)
Dept: MAMMOGRAPHY | Age: 78
End: 2023-09-22
Payer: MEDICARE

## 2023-09-22 VITALS — BODY MASS INDEX: 22.66 KG/M2 | HEIGHT: 65 IN | WEIGHT: 136 LBS

## 2023-09-22 DIAGNOSIS — Z12.31 OTHER SCREENING MAMMOGRAM: ICD-10-CM

## 2023-09-22 PROCEDURE — 77063 BREAST TOMOSYNTHESIS BI: CPT

## 2023-10-16 SDOH — HEALTH STABILITY: PHYSICAL HEALTH: ON AVERAGE, HOW MANY DAYS PER WEEK DO YOU ENGAGE IN MODERATE TO STRENUOUS EXERCISE (LIKE A BRISK WALK)?: 3 DAYS

## 2023-10-16 SDOH — HEALTH STABILITY: PHYSICAL HEALTH: ON AVERAGE, HOW MANY MINUTES DO YOU ENGAGE IN EXERCISE AT THIS LEVEL?: 40 MIN

## 2023-10-16 ASSESSMENT — LIFESTYLE VARIABLES
HOW OFTEN DO YOU HAVE SIX OR MORE DRINKS ON ONE OCCASION: 1
HOW MANY STANDARD DRINKS CONTAINING ALCOHOL DO YOU HAVE ON A TYPICAL DAY: 1
HOW MANY STANDARD DRINKS CONTAINING ALCOHOL DO YOU HAVE ON A TYPICAL DAY: 1 OR 2
HOW OFTEN DO YOU HAVE A DRINK CONTAINING ALCOHOL: 2
HOW OFTEN DO YOU HAVE A DRINK CONTAINING ALCOHOL: MONTHLY OR LESS

## 2023-10-16 ASSESSMENT — PATIENT HEALTH QUESTIONNAIRE - PHQ9
SUM OF ALL RESPONSES TO PHQ9 QUESTIONS 1 & 2: 0
SUM OF ALL RESPONSES TO PHQ QUESTIONS 1-9: 0
SUM OF ALL RESPONSES TO PHQ QUESTIONS 1-9: 0
2. FEELING DOWN, DEPRESSED OR HOPELESS: 0
SUM OF ALL RESPONSES TO PHQ QUESTIONS 1-9: 0
1. LITTLE INTEREST OR PLEASURE IN DOING THINGS: 0
SUM OF ALL RESPONSES TO PHQ QUESTIONS 1-9: 0

## 2023-10-19 ENCOUNTER — OFFICE VISIT (OUTPATIENT)
Dept: INTERNAL MEDICINE | Age: 78
End: 2023-10-19
Payer: MEDICARE

## 2023-10-19 VITALS
DIASTOLIC BLOOD PRESSURE: 64 MMHG | OXYGEN SATURATION: 97 % | HEART RATE: 68 BPM | BODY MASS INDEX: 22.96 KG/M2 | WEIGHT: 137.8 LBS | SYSTOLIC BLOOD PRESSURE: 132 MMHG | TEMPERATURE: 98.2 F | HEIGHT: 65 IN | RESPIRATION RATE: 12 BRPM

## 2023-10-19 DIAGNOSIS — Z12.31 ENCOUNTER FOR SCREENING MAMMOGRAM FOR MALIGNANT NEOPLASM OF BREAST: ICD-10-CM

## 2023-10-19 DIAGNOSIS — E78.49 OTHER HYPERLIPIDEMIA: ICD-10-CM

## 2023-10-19 DIAGNOSIS — Z78.0 POSTMENOPAUSAL: ICD-10-CM

## 2023-10-19 DIAGNOSIS — Z00.00 MEDICARE ANNUAL WELLNESS VISIT, SUBSEQUENT: ICD-10-CM

## 2023-10-19 DIAGNOSIS — Z00.00 GENERAL MEDICAL EXAM: Primary | ICD-10-CM

## 2023-10-19 DIAGNOSIS — K21.9 GASTROESOPHAGEAL REFLUX DISEASE WITHOUT ESOPHAGITIS: ICD-10-CM

## 2023-10-19 DIAGNOSIS — Z23 NEED FOR INFLUENZA VACCINATION: ICD-10-CM

## 2023-10-19 DIAGNOSIS — I10 PRIMARY HYPERTENSION: ICD-10-CM

## 2023-10-19 PROCEDURE — G8399 PT W/DXA RESULTS DOCUMENT: HCPCS | Performed by: INTERNAL MEDICINE

## 2023-10-19 PROCEDURE — G8427 DOCREV CUR MEDS BY ELIG CLIN: HCPCS | Performed by: INTERNAL MEDICINE

## 2023-10-19 PROCEDURE — 99214 OFFICE O/P EST MOD 30 MIN: CPT | Performed by: INTERNAL MEDICINE

## 2023-10-19 PROCEDURE — 90694 VACC AIIV4 NO PRSRV 0.5ML IM: CPT | Performed by: INTERNAL MEDICINE

## 2023-10-19 PROCEDURE — PBSHW INFLUENZA, FLUAD, (AGE 65 Y+), IM, PF, 0.5 ML: Performed by: INTERNAL MEDICINE

## 2023-10-19 PROCEDURE — G0439 PPPS, SUBSEQ VISIT: HCPCS | Performed by: INTERNAL MEDICINE

## 2023-10-19 PROCEDURE — 1090F PRES/ABSN URINE INCON ASSESS: CPT | Performed by: INTERNAL MEDICINE

## 2023-10-19 PROCEDURE — G8420 CALC BMI NORM PARAMETERS: HCPCS | Performed by: INTERNAL MEDICINE

## 2023-10-19 PROCEDURE — 1036F TOBACCO NON-USER: CPT | Performed by: INTERNAL MEDICINE

## 2023-10-19 PROCEDURE — 3078F DIAST BP <80 MM HG: CPT | Performed by: INTERNAL MEDICINE

## 2023-10-19 PROCEDURE — 3075F SYST BP GE 130 - 139MM HG: CPT | Performed by: INTERNAL MEDICINE

## 2023-10-19 PROCEDURE — G8484 FLU IMMUNIZE NO ADMIN: HCPCS | Performed by: INTERNAL MEDICINE

## 2023-10-19 PROCEDURE — 1123F ACP DISCUSS/DSCN MKR DOCD: CPT | Performed by: INTERNAL MEDICINE

## 2023-10-19 ASSESSMENT — ENCOUNTER SYMPTOMS
COUGH: 0
NAUSEA: 0
BACK PAIN: 0
DIARRHEA: 0
VOMITING: 0
CONSTIPATION: 0
HEARTBURN: 1
BLOOD IN STOOL: 0
SHORTNESS OF BREATH: 0
EYE PAIN: 0
ABDOMINAL PAIN: 0

## 2023-10-19 NOTE — PROGRESS NOTES
[address] [place] [address]  Dept: [phone]  Dept Fax: [phone]  Loc: [phone]     Lucian Salazar is a 70 y.o. female who presents today for her medical conditions/complaintsas noted below. Lucian Salazar is c/o of   Chief Complaint   Patient presents with    Medicare AWV     Patient is here for her Medicare AWV, GME,HTN,HL and GERD. HPI:     Other  This is a recurrent (1- General Medical Exam) problem. The current episode started today. The problem occurs intermittently. The problem has been waxing and waning. Pertinent negatives include no abdominal pain, arthralgias, chest pain, chills, coughing, fever, headaches, nausea, neck pain, numbness, rash, vomiting or weakness. Hyperlipidemia  This is a chronic problem. The current episode started more than 1 year ago. The problem is controlled. Recent lipid tests were reviewed and are variable. Pertinent negatives include no chest pain or shortness of breath. Hypertension  This is a chronic problem. The current episode started more than 1 year ago. The problem has been waxing and waning since onset. The problem is controlled. Pertinent negatives include no chest pain, headaches, neck pain, palpitations or shortness of breath. Gastroesophageal Reflux  She complains of heartburn. She reports no abdominal pain, no chest pain, no coughing or no nausea. This is a recurrent problem. The current episode started more than 1 year ago. The problem occurs rarely. The problem has been waxing and waning.        No results found for: \"LABA1C\"         No components found for: \"LABMICR\"  LDL Cholesterol (mg/dL)   Date Value   04/03/2023 82   03/29/2022 70   03/22/2021 73     LDL Calculated (mg/dL)   Date Value   09/17/2018 78   03/15/2018 58   11/09/2017 77         AST (U/L)   Date Value   04/03/2023 19     ALT (U/L)   Date Value   04/03/2023 11     BUN

## 2023-11-07 ENCOUNTER — HOSPITAL ENCOUNTER (OUTPATIENT)
Dept: BONE DENSITY | Age: 78
Discharge: HOME | End: 2023-11-09
Attending: INTERNAL MEDICINE
Payer: MEDICARE

## 2023-11-07 DIAGNOSIS — Z78.0 POSTMENOPAUSAL: ICD-10-CM

## 2023-11-07 PROCEDURE — 77080 DXA BONE DENSITY AXIAL: CPT

## 2024-02-05 RX ORDER — METOPROLOL SUCCINATE 50 MG/1
50 TABLET, EXTENDED RELEASE ORAL DAILY
Qty: 90 TABLET | Refills: 3 | Status: SHIPPED | OUTPATIENT
Start: 2024-02-05

## 2024-02-05 NOTE — TELEPHONE ENCOUNTER
[facility] called requesting a refill of the below medication which has been pended for you:     Requested Prescriptions     Pending Prescriptions Disp Refills    metoprolol succinate (TOPROL XL) 50 MG extended release tablet [Pharmacy Med Name: Metoprolol Succinate ER 50 MG Oral Tablet Extended Release 24 Hour] 90 tablet 3     Sig: TAKE 1 TABLET BY MOUTH DAILY       Last Appointment Date: 10/19/2023  Next Appointment Date: 4/19/2024    No Known Allergies

## 2024-03-07 DIAGNOSIS — E78.49 OTHER HYPERLIPIDEMIA: ICD-10-CM

## 2024-03-08 RX ORDER — TRIAMTERENE AND HYDROCHLOROTHIAZIDE 37.5; 25 MG/1; MG/1
1 TABLET ORAL DAILY
Qty: 90 TABLET | Refills: 3 | Status: SHIPPED | OUTPATIENT
Start: 2024-03-08

## 2024-03-08 RX ORDER — ATORVASTATIN CALCIUM 40 MG/1
40 TABLET, FILM COATED ORAL DAILY
Qty: 90 TABLET | Refills: 3 | Status: SHIPPED | OUTPATIENT
Start: 2024-03-08

## 2024-03-08 NOTE — TELEPHONE ENCOUNTER
[facility] called requesting a refill of the below medication which has been pended for you:     Requested Prescriptions     Pending Prescriptions Disp Refills    triamterene-hydroCHLOROthiazide (MAXZIDE-25) 37.5-25 MG per tablet [Pharmacy Med Name: Triamterene-HCTZ 37.5-25 MG Oral Tablet] 90 tablet 3     Sig: TAKE 1 TABLET BY MOUTH DAILY    atorvastatin (LIPITOR) 40 MG tablet [Pharmacy Med Name: Atorvastatin Calcium 40 MG Oral Tablet] 90 tablet 3     Sig: TAKE 1 TABLET BY MOUTH DAILY       Last Appointment Date: 10/19/2023  Next Appointment Date: 4/19/2024    No Known Allergies

## 2024-04-15 ENCOUNTER — HOSPITAL ENCOUNTER (OUTPATIENT)
Age: 79
Discharge: HOME | End: 2024-04-15
Payer: MEDICARE

## 2024-04-15 DIAGNOSIS — E78.49 OTHER HYPERLIPIDEMIA: ICD-10-CM

## 2024-04-15 DIAGNOSIS — I10 PRIMARY HYPERTENSION: ICD-10-CM

## 2024-04-15 LAB
ALBUMIN SERPL-MCNC: 4.2 G/DL (ref 3.5–5.2)
ALBUMIN/GLOB SERPL: 1.4 {RATIO} (ref 1–2.5)
ALP SERPL-CCNC: 66 U/L (ref 35–104)
ALT SERPL-CCNC: 14 U/L (ref 5–33)
ANION GAP SERPL CALCULATED.3IONS-SCNC: 9 MMOL/L (ref 9–17)
AST SERPL-CCNC: 24 U/L
BILIRUB SERPL-MCNC: 0.7 MG/DL (ref 0.3–1.2)
BUN SERPL-MCNC: 12 MG/DL (ref 8–23)
BUN/CREAT SERPL: 20 (ref 9–20)
CALCIUM SERPL-MCNC: 9.5 MG/DL (ref 8.6–10.4)
CHLORIDE SERPL-SCNC: 98 MMOL/L (ref 98–107)
CHOLEST SERPL-MCNC: 160 MG/DL (ref 0–199)
CHOLESTEROL/HDL RATIO: 5
CO2 SERPL-SCNC: 28 MMOL/L (ref 20–31)
CREAT SERPL-MCNC: 0.6 MG/DL (ref 0.5–0.9)
GFR SERPL CREATININE-BSD FRML MDRD: >90 ML/MIN/1.73M2
GLUCOSE SERPL-MCNC: 104 MG/DL (ref 70–99)
HDLC SERPL-MCNC: 36 MG/DL
LDLC SERPL CALC-MCNC: 95 MG/DL (ref 0–100)
POTASSIUM SERPL-SCNC: 4.6 MMOL/L (ref 3.7–5.3)
PROT SERPL-MCNC: 7.3 G/DL (ref 6.4–8.3)
SODIUM SERPL-SCNC: 135 MMOL/L (ref 135–144)
TRIGL SERPL-MCNC: 147 MG/DL
VLDLC SERPL CALC-MCNC: 29 MG/DL

## 2024-04-15 PROCEDURE — 80061 LIPID PANEL: CPT

## 2024-04-15 PROCEDURE — 36415 COLL VENOUS BLD VENIPUNCTURE: CPT

## 2024-04-15 PROCEDURE — 80053 COMPREHEN METABOLIC PANEL: CPT

## 2024-04-16 SDOH — ECONOMIC STABILITY: INCOME INSECURITY: HOW HARD IS IT FOR YOU TO PAY FOR THE VERY BASICS LIKE FOOD, HOUSING, MEDICAL CARE, AND HEATING?: NOT HARD AT ALL

## 2024-04-16 SDOH — ECONOMIC STABILITY: FOOD INSECURITY: WITHIN THE PAST 12 MONTHS, YOU WORRIED THAT YOUR FOOD WOULD RUN OUT BEFORE YOU GOT MONEY TO BUY MORE.: NEVER TRUE

## 2024-04-16 SDOH — ECONOMIC STABILITY: FOOD INSECURITY: WITHIN THE PAST 12 MONTHS, THE FOOD YOU BOUGHT JUST DIDN'T LAST AND YOU DIDN'T HAVE MONEY TO GET MORE.: NEVER TRUE

## 2024-04-16 SDOH — ECONOMIC STABILITY: HOUSING INSECURITY
IN THE LAST 12 MONTHS, WAS THERE A TIME WHEN YOU DID NOT HAVE A STEADY PLACE TO SLEEP OR SLEPT IN A SHELTER (INCLUDING NOW)?: NO

## 2024-04-16 SDOH — ECONOMIC STABILITY: TRANSPORTATION INSECURITY
IN THE PAST 12 MONTHS, HAS LACK OF TRANSPORTATION KEPT YOU FROM MEETINGS, WORK, OR FROM GETTING THINGS NEEDED FOR DAILY LIVING?: NO

## 2024-04-19 ENCOUNTER — OFFICE VISIT (OUTPATIENT)
Dept: INTERNAL MEDICINE | Age: 79
End: 2024-04-19
Payer: MEDICARE

## 2024-04-19 VITALS
HEIGHT: 65 IN | HEART RATE: 50 BPM | RESPIRATION RATE: 16 BRPM | DIASTOLIC BLOOD PRESSURE: 70 MMHG | OXYGEN SATURATION: 96 % | WEIGHT: 143 LBS | SYSTOLIC BLOOD PRESSURE: 122 MMHG | BODY MASS INDEX: 23.82 KG/M2

## 2024-04-19 DIAGNOSIS — K21.9 GASTROESOPHAGEAL REFLUX DISEASE WITHOUT ESOPHAGITIS: ICD-10-CM

## 2024-04-19 DIAGNOSIS — I10 PRIMARY HYPERTENSION: Primary | ICD-10-CM

## 2024-04-19 DIAGNOSIS — E78.49 OTHER HYPERLIPIDEMIA: ICD-10-CM

## 2024-04-19 DIAGNOSIS — Z12.31 ENCOUNTER FOR SCREENING MAMMOGRAM FOR MALIGNANT NEOPLASM OF BREAST: ICD-10-CM

## 2024-04-19 PROCEDURE — 1123F ACP DISCUSS/DSCN MKR DOCD: CPT | Performed by: INTERNAL MEDICINE

## 2024-04-19 PROCEDURE — 3078F DIAST BP <80 MM HG: CPT | Performed by: INTERNAL MEDICINE

## 2024-04-19 PROCEDURE — 1090F PRES/ABSN URINE INCON ASSESS: CPT | Performed by: INTERNAL MEDICINE

## 2024-04-19 PROCEDURE — G8427 DOCREV CUR MEDS BY ELIG CLIN: HCPCS | Performed by: INTERNAL MEDICINE

## 2024-04-19 PROCEDURE — 99214 OFFICE O/P EST MOD 30 MIN: CPT | Performed by: INTERNAL MEDICINE

## 2024-04-19 PROCEDURE — G8399 PT W/DXA RESULTS DOCUMENT: HCPCS | Performed by: INTERNAL MEDICINE

## 2024-04-19 PROCEDURE — 3074F SYST BP LT 130 MM HG: CPT | Performed by: INTERNAL MEDICINE

## 2024-04-19 PROCEDURE — 1036F TOBACCO NON-USER: CPT | Performed by: INTERNAL MEDICINE

## 2024-04-19 PROCEDURE — 99212 OFFICE O/P EST SF 10 MIN: CPT | Performed by: INTERNAL MEDICINE

## 2024-04-19 PROCEDURE — G8420 CALC BMI NORM PARAMETERS: HCPCS | Performed by: INTERNAL MEDICINE

## 2024-04-19 ASSESSMENT — PATIENT HEALTH QUESTIONNAIRE - PHQ9
2. FEELING DOWN, DEPRESSED OR HOPELESS: NOT AT ALL
SUM OF ALL RESPONSES TO PHQ QUESTIONS 1-9: 0
SUM OF ALL RESPONSES TO PHQ9 QUESTIONS 1 & 2: 0
SUM OF ALL RESPONSES TO PHQ QUESTIONS 1-9: 0
SUM OF ALL RESPONSES TO PHQ QUESTIONS 1-9: 0
1. LITTLE INTEREST OR PLEASURE IN DOING THINGS: NOT AT ALL
SUM OF ALL RESPONSES TO PHQ QUESTIONS 1-9: 0

## 2024-04-19 ASSESSMENT — ENCOUNTER SYMPTOMS
COUGH: 0
VOMITING: 0
NAUSEA: 0
DIARRHEA: 0
ABDOMINAL PAIN: 0
SHORTNESS OF BREATH: 0
HEARTBURN: 1
EYE PAIN: 0
BACK PAIN: 0
BLOOD IN STOOL: 0
CONSTIPATION: 0

## 2024-04-19 NOTE — PROGRESS NOTES
visual disturbance.   Respiratory:  Negative for cough and shortness of breath.    Cardiovascular:  Negative for chest pain, palpitations and leg swelling.   Gastrointestinal:  Positive for heartburn. Negative for abdominal pain, blood in stool, constipation, diarrhea, nausea and vomiting.   Endocrine: Negative for cold intolerance, polydipsia and polyuria.   Genitourinary:  Negative for difficulty urinating, dysuria and hematuria.   Musculoskeletal:  Negative for arthralgias, back pain, gait problem and neck pain.   Skin:  Negative for pallor and rash.   Neurological:  Negative for dizziness, weakness, numbness and headaches.   Hematological:  Negative for adenopathy. Does not bruise/bleed easily.   Psychiatric/Behavioral:  Negative for confusion. The patient is not nervous/anxious.        Objective:     Physical Exam  Vitals reviewed.   Constitutional:       Appearance: She is well-developed.   HENT:      Head: Normocephalic and atraumatic.   Eyes:      Pupils: Pupils are equal, round, and reactive to light.   Cardiovascular:      Rate and Rhythm: Normal rate and regular rhythm.      Heart sounds: No murmur heard.     No friction rub. No gallop.   Pulmonary:      Effort: Pulmonary effort is normal.      Breath sounds: Normal breath sounds. No wheezing or rales.   Abdominal:      General: There is no distension.      Palpations: Abdomen is soft. There is no mass.      Tenderness: There is no abdominal tenderness. There is no rebound.   Musculoskeletal:         General: Normal range of motion.      Cervical back: Neck supple.   Lymphadenopathy:      Cervical: No cervical adenopathy.   Skin:     General: Skin is warm and dry.      Findings: No rash.   Neurological:      Mental Status: She is alert and oriented to person, place, and time.      Cranial Nerves: No cranial nerve deficit (grossly).   Psychiatric:         Thought Content: Thought content normal.        /70 (Site: Left Upper Arm, Position: Sitting,

## 2024-09-23 ENCOUNTER — HOSPITAL ENCOUNTER (OUTPATIENT)
Dept: MAMMOGRAPHY | Age: 79
Discharge: HOME | End: 2024-09-25
Attending: INTERNAL MEDICINE
Payer: MEDICARE

## 2024-09-23 VITALS — HEIGHT: 65 IN | WEIGHT: 142 LBS | BODY MASS INDEX: 23.66 KG/M2

## 2024-09-23 DIAGNOSIS — Z12.31 ENCOUNTER FOR SCREENING MAMMOGRAM FOR MALIGNANT NEOPLASM OF BREAST: ICD-10-CM

## 2024-09-23 PROCEDURE — 77063 BREAST TOMOSYNTHESIS BI: CPT

## 2024-10-22 SDOH — HEALTH STABILITY: PHYSICAL HEALTH: ON AVERAGE, HOW MANY MINUTES DO YOU ENGAGE IN EXERCISE AT THIS LEVEL?: 30 MIN

## 2024-10-22 SDOH — HEALTH STABILITY: PHYSICAL HEALTH: ON AVERAGE, HOW MANY DAYS PER WEEK DO YOU ENGAGE IN MODERATE TO STRENUOUS EXERCISE (LIKE A BRISK WALK)?: 3 DAYS

## 2024-10-22 ASSESSMENT — PATIENT HEALTH QUESTIONNAIRE - PHQ9
SUM OF ALL RESPONSES TO PHQ9 QUESTIONS 1 & 2: 0
SUM OF ALL RESPONSES TO PHQ QUESTIONS 1-9: 0
1. LITTLE INTEREST OR PLEASURE IN DOING THINGS: NOT AT ALL
SUM OF ALL RESPONSES TO PHQ QUESTIONS 1-9: 0
2. FEELING DOWN, DEPRESSED OR HOPELESS: NOT AT ALL
SUM OF ALL RESPONSES TO PHQ QUESTIONS 1-9: 0
SUM OF ALL RESPONSES TO PHQ QUESTIONS 1-9: 0

## 2024-10-22 ASSESSMENT — LIFESTYLE VARIABLES
HOW MANY STANDARD DRINKS CONTAINING ALCOHOL DO YOU HAVE ON A TYPICAL DAY: PATIENT DOES NOT DRINK
HOW OFTEN DO YOU HAVE A DRINK CONTAINING ALCOHOL: 1
HOW OFTEN DO YOU HAVE A DRINK CONTAINING ALCOHOL: NEVER
HOW MANY STANDARD DRINKS CONTAINING ALCOHOL DO YOU HAVE ON A TYPICAL DAY: 0
HOW OFTEN DO YOU HAVE SIX OR MORE DRINKS ON ONE OCCASION: 1

## 2024-10-25 ENCOUNTER — OFFICE VISIT (OUTPATIENT)
Dept: INTERNAL MEDICINE | Age: 79
End: 2024-10-25

## 2024-10-25 VITALS
WEIGHT: 150 LBS | HEART RATE: 55 BPM | SYSTOLIC BLOOD PRESSURE: 128 MMHG | RESPIRATION RATE: 16 BRPM | OXYGEN SATURATION: 95 % | BODY MASS INDEX: 24.99 KG/M2 | HEIGHT: 65 IN | DIASTOLIC BLOOD PRESSURE: 70 MMHG

## 2024-10-25 DIAGNOSIS — Z00.00 MEDICARE ANNUAL WELLNESS VISIT, SUBSEQUENT: ICD-10-CM

## 2024-10-25 DIAGNOSIS — K21.9 GASTROESOPHAGEAL REFLUX DISEASE WITHOUT ESOPHAGITIS: ICD-10-CM

## 2024-10-25 DIAGNOSIS — I10 PRIMARY HYPERTENSION: ICD-10-CM

## 2024-10-25 DIAGNOSIS — E78.49 OTHER HYPERLIPIDEMIA: ICD-10-CM

## 2024-10-25 DIAGNOSIS — Z00.00 GENERAL MEDICAL EXAM: Primary | ICD-10-CM

## 2024-10-25 ASSESSMENT — ENCOUNTER SYMPTOMS
HEARTBURN: 1
NAUSEA: 0
VOMITING: 0
CONSTIPATION: 0
BLOOD IN STOOL: 0
SHORTNESS OF BREATH: 0
ABDOMINAL PAIN: 0
BACK PAIN: 0
DIARRHEA: 0
COUGH: 0
EYE PAIN: 0

## 2024-10-25 NOTE — PROGRESS NOTES
Medicare Annual Wellness Visit    Aviva Pineda is here for Medicare AWV, Hypertension, Hyperlipidemia, and Gastroesophageal Reflux    Assessment & Plan     Recommendations for Preventive Services Due: see orders and patient instructions/AVS.  Recommended screening schedule for the next 5-10 years is provided to the patient in written form: see Patient Instructions/AVS.     Return in 27 weeks (on 5/2/2025) for Hypertension, Hyperlipidemia.     Subjective       Patient's complete Health Risk Assessment and screening values have been reviewed and are found in Flowsheets. The following problems were reviewed today and where indicated follow up appointments were made and/or referrals ordered.    Positive Risk Factor Screenings with Interventions:                      Safety:  Do you have any tripping hazards - loose or unsecured carpets or rugs?: (!) Yes  Interventions:  Patient declined any further interventions or treatment                   Objective   Vitals:    10/25/24 0904   BP: 128/70   Site: Left Upper Arm   Position: Sitting   Cuff Size: Large Adult   Pulse: 55   Resp: 16   SpO2: 95%   Weight: 68 kg (150 lb)   Height: 1.651 m (5' 5\")      Body mass index is 24.96 kg/m².                No Known Allergies  Prior to Visit Medications    Medication Sig Taking? Authorizing Provider   triamterene-hydroCHLOROthiazide (MAXZIDE-25) 37.5-25 MG per tablet TAKE 1 TABLET BY MOUTH DAILY Yes Harry Melo MD   atorvastatin (LIPITOR) 40 MG tablet TAKE 1 TABLET BY MOUTH DAILY Yes Harry Melo MD   metoprolol succinate (TOPROL XL) 50 MG extended release tablet TAKE 1 TABLET BY MOUTH DAILY Yes Harry Melo MD   Calcium 500-125 MG-UNIT TABS 1 tablet Yes Neri Thomson MD   omeprazole (PRILOSEC) 20 MG delayed release capsule Take 1 capsule by mouth every morning (before breakfast) Yes Billy Greer MD   fexofenadine (ALLEGRA) 180 MG tablet Take 1 tablet by mouth daily Yes Provider, Historical, MD   niacin 
Mental Status: She is alert and oriented to person, place, and time.      Cranial Nerves: No cranial nerve deficit (grossly).   Psychiatric:         Thought Content: Thought content normal.        /70 (Site: Left Upper Arm, Position: Sitting, Cuff Size: Large Adult)   Pulse 55   Resp 16   Ht 1.651 m (5' 5\")   Wt 68 kg (150 lb)   LMP  (LMP Unknown)   SpO2 95%   BMI 24.96 kg/m²     Assessment:       Diagnosis Orders   1. General medical exam        2. Medicare annual wellness visit, subsequent        3. Other hyperlipidemia  Lipid, Fasting      4. Primary hypertension  Comprehensive Metabolic Panel      5. Gastroesophageal reflux disease without esophagitis           I reviewed multiple test results.    4/15/2024 okay glucose at 104    4/15/2024 normal total cholesterol at 116    9/23/2024 mammogram benign.    Patient told to continue Lipitor.       Plan:    Assessment & Plan   Return in 27 weeks (on 5/2/2025) for Hypertension, Hyperlipidemia.    Orders Placed This Encounter   Procedures    Influenza, FLUAD Trivalent, (age 65 y+), IM, Preservative Free, 0.5mL    Comprehensive Metabolic Panel     Standing Status:   Future     Standing Expiration Date:   10/25/2025    Lipid, Fasting     Standing Status:   Future     Standing Expiration Date:   10/25/2025     No orders of the defined types were placed in this encounter.      Patientgiven educational materials - see patient instructions.  Discussed use, benefit,and side effects of prescribed medications.  All patient questions answered. Ptvoiced understanding. Reviewed health maintenance.  Instructed to continue currentmedications, diet and exercise.  Patient agreed with treatment plan. Follow up asdirected.     Electronically signed by Harry Melo MD on 10/25/2024 at 9:14 AM

## 2024-12-12 RX ORDER — METOPROLOL SUCCINATE 50 MG/1
50 TABLET, EXTENDED RELEASE ORAL DAILY
Qty: 90 TABLET | Refills: 3 | Status: SHIPPED | OUTPATIENT
Start: 2024-12-12

## 2024-12-12 NOTE — TELEPHONE ENCOUNTER
Aviva called requesting a refill of the below medication which has been pended for you:     Requested Prescriptions     Pending Prescriptions Disp Refills    metoprolol succinate (TOPROL XL) 50 MG extended release tablet [Pharmacy Med Name: Metoprolol Succinate ER 50 MG Oral Tablet Extended Release 24 Hour] 90 tablet 3     Sig: TAKE 1 TABLET BY MOUTH DAILY       Last Appointment Date: 10/25/2024  Next Appointment Date: 5/2/2025    No Known Allergies

## 2025-01-16 DIAGNOSIS — E78.49 OTHER HYPERLIPIDEMIA: ICD-10-CM

## 2025-01-17 RX ORDER — TRIAMTERENE AND HYDROCHLOROTHIAZIDE 37.5; 25 MG/1; MG/1
1 TABLET ORAL DAILY
Qty: 90 TABLET | Refills: 3 | Status: SHIPPED | OUTPATIENT
Start: 2025-01-17

## 2025-01-17 RX ORDER — ATORVASTATIN CALCIUM 40 MG/1
40 TABLET, FILM COATED ORAL DAILY
Qty: 90 TABLET | Refills: 3 | Status: SHIPPED | OUTPATIENT
Start: 2025-01-17

## 2025-01-17 NOTE — TELEPHONE ENCOUNTER
Aviva called requesting a refill of the below medication which has been pended for you:     Requested Prescriptions     Pending Prescriptions Disp Refills    triamterene-hydroCHLOROthiazide (MAXZIDE-25) 37.5-25 MG per tablet [Pharmacy Med Name: Triamterene-HCTZ 37.5-25 MG Oral Tablet] 90 tablet 3     Sig: TAKE 1 TABLET BY MOUTH DAILY       Last Appointment Date: 10/25/2024  Next Appointment Date: 5/2/2025    No Known Allergies

## 2025-01-17 NOTE — TELEPHONE ENCOUNTER
Aviva called requesting a refill of the below medication which has been pended for you:     Requested Prescriptions     Pending Prescriptions Disp Refills    atorvastatin (LIPITOR) 40 MG tablet [Pharmacy Med Name: Atorvastatin Calcium 40 MG Oral Tablet] 90 tablet 3     Sig: TAKE 1 TABLET BY MOUTH DAILY       Last Appointment Date: 10/25/2024  Next Appointment Date: 1/17/2025    No Known Allergies

## 2025-04-28 ENCOUNTER — RESULTS FOLLOW-UP (OUTPATIENT)
Dept: INTERNAL MEDICINE | Age: 80
End: 2025-04-28

## 2025-04-28 ENCOUNTER — HOSPITAL ENCOUNTER (OUTPATIENT)
Age: 80
Discharge: HOME | End: 2025-04-28
Payer: MEDICARE

## 2025-04-28 DIAGNOSIS — I10 PRIMARY HYPERTENSION: ICD-10-CM

## 2025-04-28 DIAGNOSIS — E78.49 OTHER HYPERLIPIDEMIA: ICD-10-CM

## 2025-04-28 LAB
ALBUMIN SERPL-MCNC: 4.4 G/DL (ref 3.5–5.2)
ALBUMIN/GLOB SERPL: 1.5 {RATIO} (ref 1–2.5)
ALP SERPL-CCNC: 61 U/L (ref 35–104)
ALT SERPL-CCNC: 19 U/L (ref 10–35)
ANION GAP SERPL CALCULATED.3IONS-SCNC: 13 MMOL/L (ref 9–16)
AST SERPL-CCNC: 25 U/L (ref 10–35)
BILIRUB SERPL-MCNC: 0.6 MG/DL (ref 0–1.2)
BUN SERPL-MCNC: 11 MG/DL (ref 8–23)
BUN/CREAT SERPL: 16 (ref 9–20)
CALCIUM SERPL-MCNC: 9.7 MG/DL (ref 8.6–10.4)
CHLORIDE SERPL-SCNC: 99 MMOL/L (ref 98–107)
CHOLEST SERPL-MCNC: 140 MG/DL (ref 0–199)
CHOLESTEROL/HDL RATIO: 3.6
CO2 SERPL-SCNC: 25 MMOL/L (ref 20–31)
CREAT SERPL-MCNC: 0.7 MG/DL (ref 0.6–0.9)
GFR, ESTIMATED: 89 ML/MIN/1.73M2
GLUCOSE SERPL-MCNC: 96 MG/DL (ref 74–99)
HDLC SERPL-MCNC: 39 MG/DL
LDLC SERPL CALC-MCNC: 81 MG/DL (ref 0–100)
POTASSIUM SERPL-SCNC: 4.1 MMOL/L (ref 3.7–5.3)
PROT SERPL-MCNC: 7.4 G/DL (ref 6.6–8.7)
SODIUM SERPL-SCNC: 137 MMOL/L (ref 136–145)
TRIGL SERPL-MCNC: 98 MG/DL (ref 0–149)
VLDLC SERPL CALC-MCNC: 20 MG/DL (ref 1–30)

## 2025-04-28 PROCEDURE — 80061 LIPID PANEL: CPT

## 2025-04-28 PROCEDURE — 80053 COMPREHEN METABOLIC PANEL: CPT

## 2025-04-28 PROCEDURE — 36415 COLL VENOUS BLD VENIPUNCTURE: CPT

## 2025-04-29 SDOH — ECONOMIC STABILITY: FOOD INSECURITY: WITHIN THE PAST 12 MONTHS, YOU WORRIED THAT YOUR FOOD WOULD RUN OUT BEFORE YOU GOT MONEY TO BUY MORE.: NEVER TRUE

## 2025-04-29 SDOH — ECONOMIC STABILITY: FOOD INSECURITY: WITHIN THE PAST 12 MONTHS, THE FOOD YOU BOUGHT JUST DIDN'T LAST AND YOU DIDN'T HAVE MONEY TO GET MORE.: NEVER TRUE

## 2025-04-29 SDOH — ECONOMIC STABILITY: INCOME INSECURITY: IN THE LAST 12 MONTHS, WAS THERE A TIME WHEN YOU WERE NOT ABLE TO PAY THE MORTGAGE OR RENT ON TIME?: NO

## 2025-04-29 SDOH — ECONOMIC STABILITY: TRANSPORTATION INSECURITY
IN THE PAST 12 MONTHS, HAS THE LACK OF TRANSPORTATION KEPT YOU FROM MEDICAL APPOINTMENTS OR FROM GETTING MEDICATIONS?: NO

## 2025-04-29 ASSESSMENT — PATIENT HEALTH QUESTIONNAIRE - PHQ9
1. LITTLE INTEREST OR PLEASURE IN DOING THINGS: NOT AT ALL
SUM OF ALL RESPONSES TO PHQ QUESTIONS 1-9: 0
2. FEELING DOWN, DEPRESSED OR HOPELESS: NOT AT ALL
2. FEELING DOWN, DEPRESSED OR HOPELESS: NOT AT ALL
SUM OF ALL RESPONSES TO PHQ9 QUESTIONS 1 & 2: 0
1. LITTLE INTEREST OR PLEASURE IN DOING THINGS: NOT AT ALL
SUM OF ALL RESPONSES TO PHQ QUESTIONS 1-9: 0

## 2025-05-02 ENCOUNTER — OFFICE VISIT (OUTPATIENT)
Dept: INTERNAL MEDICINE | Age: 80
End: 2025-05-02
Payer: MEDICARE

## 2025-05-02 VITALS
BODY MASS INDEX: 25.99 KG/M2 | OXYGEN SATURATION: 98 % | HEART RATE: 51 BPM | SYSTOLIC BLOOD PRESSURE: 114 MMHG | WEIGHT: 156 LBS | RESPIRATION RATE: 16 BRPM | HEIGHT: 65 IN | DIASTOLIC BLOOD PRESSURE: 62 MMHG

## 2025-05-02 DIAGNOSIS — M54.42 CHRONIC LEFT-SIDED LOW BACK PAIN WITH LEFT-SIDED SCIATICA: ICD-10-CM

## 2025-05-02 DIAGNOSIS — I10 PRIMARY HYPERTENSION: Primary | ICD-10-CM

## 2025-05-02 DIAGNOSIS — Z12.31 VISIT FOR SCREENING MAMMOGRAM: ICD-10-CM

## 2025-05-02 DIAGNOSIS — E78.49 OTHER HYPERLIPIDEMIA: ICD-10-CM

## 2025-05-02 DIAGNOSIS — K21.9 GASTROESOPHAGEAL REFLUX DISEASE WITHOUT ESOPHAGITIS: ICD-10-CM

## 2025-05-02 DIAGNOSIS — G89.29 CHRONIC LEFT-SIDED LOW BACK PAIN WITH LEFT-SIDED SCIATICA: ICD-10-CM

## 2025-05-02 PROCEDURE — 3074F SYST BP LT 130 MM HG: CPT | Performed by: INTERNAL MEDICINE

## 2025-05-02 PROCEDURE — 1036F TOBACCO NON-USER: CPT | Performed by: INTERNAL MEDICINE

## 2025-05-02 PROCEDURE — 1160F RVW MEDS BY RX/DR IN RCRD: CPT | Performed by: INTERNAL MEDICINE

## 2025-05-02 PROCEDURE — 3078F DIAST BP <80 MM HG: CPT | Performed by: INTERNAL MEDICINE

## 2025-05-02 PROCEDURE — 1090F PRES/ABSN URINE INCON ASSESS: CPT | Performed by: INTERNAL MEDICINE

## 2025-05-02 PROCEDURE — 99214 OFFICE O/P EST MOD 30 MIN: CPT | Performed by: INTERNAL MEDICINE

## 2025-05-02 PROCEDURE — G8427 DOCREV CUR MEDS BY ELIG CLIN: HCPCS | Performed by: INTERNAL MEDICINE

## 2025-05-02 PROCEDURE — 1123F ACP DISCUSS/DSCN MKR DOCD: CPT | Performed by: INTERNAL MEDICINE

## 2025-05-02 PROCEDURE — 1159F MED LIST DOCD IN RCRD: CPT | Performed by: INTERNAL MEDICINE

## 2025-05-02 PROCEDURE — G8419 CALC BMI OUT NRM PARAM NOF/U: HCPCS | Performed by: INTERNAL MEDICINE

## 2025-05-02 PROCEDURE — 99212 OFFICE O/P EST SF 10 MIN: CPT | Performed by: INTERNAL MEDICINE

## 2025-05-02 PROCEDURE — G8399 PT W/DXA RESULTS DOCUMENT: HCPCS | Performed by: INTERNAL MEDICINE

## 2025-05-02 ASSESSMENT — ENCOUNTER SYMPTOMS
HEARTBURN: 1
BACK PAIN: 0
CONSTIPATION: 0
NAUSEA: 0
DIARRHEA: 0
EYE PAIN: 0
BLOOD IN STOOL: 0
SHORTNESS OF BREATH: 0
ABDOMINAL PAIN: 0
VOMITING: 0
COUGH: 0

## 2025-05-02 NOTE — PROGRESS NOTES
[facility]  [facility]  [address] [facility] [address]  Dept: [phone]  Dept Fax: [phone]  Loc: [phone]     Aviva Pineda is a 79 y.o. female who presents today for her medical conditions/complaintsas noted below.  Aviva Pineda is c/o of   Chief Complaint   Patient presents with    Hypertension    Hyperlipidemia    Gastroesophageal Reflux         HPI:     Hypertension  This is a chronic problem. The current episode started more than 1 year ago. The problem has been waxing and waning since onset. The problem is controlled. Pertinent negatives include no chest pain, headaches, neck pain, palpitations or shortness of breath.   Hyperlipidemia  This is a chronic problem. The current episode started more than 1 year ago. The problem is controlled. Recent lipid tests were reviewed and are variable. Pertinent negatives include no chest pain or shortness of breath.   Gastroesophageal Reflux  She complains of heartburn. She reports no abdominal pain, no chest pain, no coughing or no nausea. This is a recurrent problem. The current episode started more than 1 year ago. The problem occurs rarely. The problem has been waxing and waning.       No results found for: \"LABA1C\"         No components found for: \"LABMICR\"  No components found for: \"LDLCHOLESTEROL\", \"LDLCALC\"      AST (U/L)   Date Value   04/28/2025 25     ALT (U/L)   Date Value   04/28/2025 19     BUN (mg/dL)   Date Value   04/28/2025 11     BP Readings from Last 3 Encounters:   05/02/25 114/62   10/25/24 128/70   04/19/24 122/70              Past Medical History:   Diagnosis Date    Allergic rhinitis     Bean's esophagus     Chronic kidney disease     chronic renal insufficiency    Dizziness     GERD (gastroesophageal reflux disease)     Hyperlipidemia     Hypertension     Measles     Osteoarthritis     Osteoporosis     Seasonal allergies       Past Surgical History:

## (undated) DEVICE — FORCEPS BX L L240CM DIA2.4MM RAD JAW 4 HOT FOR POLYP DISP

## (undated) DEVICE — CANNULA NSL AD L2IN ETCO2 SAMP SFT CRUSH RESIST FEM AIRLFE

## (undated) DEVICE — COLONOSCOPE ENDOSCP ABSORBENT SM PEDIATRIC 104 MM VISION

## (undated) DEVICE — MERCY DEFIANCE ENDO KIT: Brand: MEDLINE INDUSTRIES, INC.

## (undated) DEVICE — 1200CC GUARDIAN II: Brand: GUARDIAN

## (undated) DEVICE — 60 ML SYRINGE REGULAR TIP: Brand: MONOJECT

## (undated) DEVICE — BITE BLOCK W/VELCRO STRAP

## (undated) DEVICE — CONNECTOR TBNG AUX H2O JET DISP FOR OLY 160/180 SER